# Patient Record
Sex: MALE | Race: BLACK OR AFRICAN AMERICAN | NOT HISPANIC OR LATINO | Employment: UNEMPLOYED | URBAN - METROPOLITAN AREA
[De-identification: names, ages, dates, MRNs, and addresses within clinical notes are randomized per-mention and may not be internally consistent; named-entity substitution may affect disease eponyms.]

---

## 2017-12-29 ENCOUNTER — HOSPITAL ENCOUNTER (OUTPATIENT)
Dept: ULTRASOUND IMAGING | Facility: CLINIC | Age: 67
Discharge: HOME | End: 2017-12-29
Attending: FAMILY MEDICINE | Admitting: FAMILY MEDICINE
Payer: MEDICAID

## 2017-12-29 DIAGNOSIS — R31.9 HEMATURIA: ICD-10-CM

## 2017-12-29 PROCEDURE — 76770 US EXAM ABDO BACK WALL COMP: CPT

## 2017-12-29 PROCEDURE — T1013 SIGN LANG/ORAL INTERPRETER: HCPCS | Mod: U3

## 2018-02-14 ENCOUNTER — OFFICE VISIT (OUTPATIENT)
Dept: FAMILY MEDICINE | Facility: CLINIC | Age: 68
End: 2018-02-14
Payer: COMMERCIAL

## 2018-02-14 VITALS
SYSTOLIC BLOOD PRESSURE: 128 MMHG | RESPIRATION RATE: 16 BRPM | WEIGHT: 133.4 LBS | BODY MASS INDEX: 20.94 KG/M2 | TEMPERATURE: 98.2 F | HEIGHT: 67 IN | DIASTOLIC BLOOD PRESSURE: 82 MMHG | HEART RATE: 62 BPM

## 2018-02-14 DIAGNOSIS — R10.11 BILATERAL UPPER ABDOMINAL PAIN: Primary | ICD-10-CM

## 2018-02-14 DIAGNOSIS — R31.29 OTHER MICROSCOPIC HEMATURIA: ICD-10-CM

## 2018-02-14 DIAGNOSIS — R10.12 BILATERAL UPPER ABDOMINAL PAIN: Primary | ICD-10-CM

## 2018-02-14 LAB
% GRANULOCYTES: 41.5 %G (ref 40–75)
ALBUMIN SERPL-MCNC: 4.3 MG/DL (ref 3.5–4.7)
ALP SERPL-CCNC: 63.2 U/L (ref 31.7–110.7)
ALT SERPL-CCNC: 10.7 U/L (ref 0–45)
AST SERPL-CCNC: 11.3 U/L (ref 0–55)
BACTERIA: NORMAL
BILIRUB SERPL-MCNC: 0.5 MG/DL (ref 0.2–1.3)
BILIRUBIN UR: ABNORMAL
BLOOD UR: ABNORMAL
BUN SERPL-MCNC: 7.2 MG/DL (ref 7–21)
CALCIUM SERPL-MCNC: 9.8 MG/DL (ref 8.5–10.1)
CASTS: NORMAL /LPF
CHLORIDE SERPLBLD-SCNC: 103.5 MMOL/L (ref 98–110)
CO2 SERPL-SCNC: 26.5 MMOL/L (ref 20–32)
CREAT SERPL-MCNC: 0.7 MG/DL (ref 0.7–1.3)
CRYSTAL URINE: NORMAL /LPF
EPITHELIAL CELLS UR: NORMAL /LPF (ref 0–2)
GFR SERPL CREATININE-BSD FRML MDRD: >90 ML/MIN/1.7 M2
GLUCOSE SERPL-MCNC: 98 MG'DL (ref 70–99)
GLUCOSE URINE: NEGATIVE
GRANULOCYTES #: 2.2 K/UL (ref 1.6–8.3)
HCT VFR BLD AUTO: 45.9 % (ref 40–53)
HEMOGLOBIN: 14.9 G/DL (ref 13.3–17.7)
KETONES UR QL: NEGATIVE
LEUKOCYTE ESTERASE UR: NEGATIVE
LYMPHOCYTES # BLD AUTO: 2.3 K/UL (ref 0.8–5.3)
LYMPHOCYTES NFR BLD AUTO: 43.8 %L (ref 20–48)
MCH RBC QN AUTO: 31.4 PG (ref 26.5–35)
MCHC RBC AUTO-ENTMCNC: 32.5 G/DL (ref 32–36)
MCV RBC AUTO: 96.7 FL (ref 78–100)
MID #: 0.8 K/UL (ref 0–2.2)
MID %: 14.7 %M (ref 0–20)
MUCOUS URINE: NORMAL LPF
NITRITE UR QL STRIP: NEGATIVE
PH UR STRIP: 5.5 [PH] (ref 5–7)
PLATELET # BLD AUTO: 270 K/UL (ref 150–450)
POTASSIUM SERPL-SCNC: 3.8 MMOL/DL (ref 3.3–4.5)
PROT SERPL-MCNC: 7.9 G/DL (ref 6.8–8.8)
PROTEIN UR: NEGATIVE
RBC # BLD AUTO: 4.75 M/UL (ref 4.4–5.9)
RBC URINE: NORMAL /HPF
SODIUM SERPL-SCNC: 138.8 MMOL/L (ref 132.6–141.4)
SP GR UR STRIP: 1.02
UROBILINOGEN UR STRIP-ACNC: ABNORMAL
WBC # BLD AUTO: 5.3 K/UL (ref 4–11)
WBC URINE: NORMAL /HPF

## 2018-02-14 RX ORDER — ACETAMINOPHEN 325 MG/1
650 TABLET ORAL EVERY 6 HOURS PRN
Qty: 100 TABLET | Refills: 1 | Status: SHIPPED | OUTPATIENT
Start: 2018-02-14 | End: 2023-01-02

## 2018-02-14 NOTE — MR AVS SNAPSHOT
After Visit Summary   2/14/2018    Buck Delacruz    MRN: [id number]           Patient Information     Date Of Birth          1950        Visit Information        Provider Department      2/14/2018 8:20 AM Daryl Watts MD [facility]        Today's Diagnoses     Bilateral upper abdominal pain    -  1    Other microscopic hematuria          Care Instructions    Here is the plan from today's visit    1. Bilateral upper abdominal pain  - CBC with Diff Plt ([facility])  - Comprehensive Metabolic Panel ([facility])  - acetaminophen (TYLENOL) 325 MG tablet; Take 2 tablets (650 mg) by mouth every 6 hours as needed for pain  Dispense: 100 tablet; Refill: 1    2. Other microscopic hematuria  - UROLOGY ADULT REFERRAL - INTERNAL      Follow up plan  Schedule with urology for microscopic blood in the urine.    Thank you for coming to [facility] today.  Lab Testing:  **If you had lab testing today and your results are reassuring or normal they will be mailed to you or sent through [employer]ch within 7 days.   **If the lab tests need quick action we will call you with the results.  The phone number we will call with results is # [phone] (home) . If this is not the best number please call our clinic and change the number.  Medication Refills:  If you need any refills please call your pharmacy and they will contact us.   If you need to  your refill at a new pharmacy, please contact the new pharmacy directly. The new pharmacy will help you get your medications transferred faster.   Scheduling:  If you have any concerns about today's visit or wish to schedule another appointment please call our office during normal business hours [phone] (8-5:00 M-F)  If a referral was made to a [facility] and you don't get a call from central scheduling please call [phone].  If a Mammogram was ordered for you at [facility] call [phone] to schedule or  change your appointment.  If you had an XRay/CT/Ultrasound/MRI ordered the number is [phone] to schedule or change your radiology appointment.   Medical Concerns:  If you have urgent medical concerns please call [phone] at any time of the day.            Follow-ups after your visit        Additional Services     UROLOGY ADULT REFERRAL - INTERNAL       Your provider has referred you to: [facility]: [facility] [phone]   [email or web address]    Please be aware that coverage of these services is subject to the terms and limitations of your health insurance plan.  Call member services at your health plan with any benefit or coverage questions.      Please bring the following with you to your appointment:    (1) Any X-Rays, CTs or MRIs which have been performed.  Contact the facility where they were done to arrange for  prior to your scheduled appointment.    (2) List of current medications  (3) This referral request   (4) Any documents/labs given to you for this referral                  Who to contact     Please call your clinic at [phone] to:    Ask questions about your health    Make or cancel appointments    Discuss your medicines    Learn about your test results    Speak to your doctor            Additional Information About Your Visit        MyChart Information     ev-socialhart is an electronic gateway that provides easy, online access to your medical records. With BollingoBlog, you can request a clinic appointment, read your test results, renew a prescription or communicate with your care team.     To sign up for [employer]angat visit the website at [email or web address]   You will be asked to enter the access code listed below, as well as some personal information. Please follow the directions to create your username and password.     Your access code is: [email or web address]  Expires: 5/15/2018  9:55 AM     Your access code will  in 90 days. If you need help or a new code, please  "contact your [facility] or call [phone] for assistance.        Care EveryWhere ID     This is your Care EveryWhere ID. This could be used by other organizations to access your [facility] medical records  [id number]        Your Vitals Were     Pulse Temperature Respirations Height BMI (Body Mass Index)       62 98.2  F (36.8  C) (Oral) 16 5' 7\" (170.2 cm) 20.89 kg/m2        Blood Pressure from Last 3 Encounters:   02/14/18 128/82    Weight from Last 3 Encounters:   02/14/18 133 lb 6.4 oz (60.5 kg)              We Performed the Following     CBC with Diff Plt ([facility])     Comprehensive Metabolic Panel ([facility])     Urinalysis, Micro If (UA) ([facility])     Urine Microscopic (UA) ([facility])     UROLOGY ADULT REFERRAL - INTERNAL          Today's Medication Changes          These changes are accurate as of 2/14/18  9:55 AM.  If you have any questions, ask your nurse or doctor.               Start taking these medicines.        Dose/Directions    acetaminophen 325 MG tablet   Commonly known as:  TYLENOL   Used for:  Bilateral upper abdominal pain   Started by:  Daryl Watts MD        Dose:  650 mg   Take 2 tablets (650 mg) by mouth every 6 hours as needed for pain   Quantity:  100 tablet   Refills:  1            Where to get your medicines      These medications were sent to [facility] [place], [place] - [address]   [address] [facility] [address]     Phone:  [phone]     acetaminophen 325 MG tablet                Primary Care Provider Office Phone # Fax #    Clinic [facility] [phone]       [address] [facility] [address]        Equal Access to Services     KODI CASSIDY AH: Hadii christie crews Sodennis, waaxda luqadaha, qaybta kaalmada nhung glaser. So [facility] [phone].    ATENCIÓN: Si habla español, tiene a wilcox disposición servicios gratuitos de asistencia lingüística. Llame al " [phone].    We comply with applicable federal civil rights laws and [place] laws. We do not discriminate on the basis of race, color, national origin, age, disability, sex, sexual orientation, or gender identity.            Thank you!     Thank you for choosing [facility]  for your care. Our goal is always to provide you with excellent care. Hearing back from our patients is one way we can continue to improve our services. Please take a few minutes to complete the written survey that you may receive in the mail after your visit with us. Thank you!             Your Updated Medication List - Protect others around you: Learn how to safely use, store and throw away your medicines at www.disposemymeds.org.          This list is accurate as of 2/14/18  9:55 AM.  Always use your most recent med list.                   Brand Name Dispense Instructions for use Diagnosis    acetaminophen 325 MG tablet    TYLENOL    100 tablet    Take 2 tablets (650 mg) by mouth every 6 hours as needed for pain    Bilateral upper abdominal pain

## 2018-02-14 NOTE — PATIENT INSTRUCTIONS
Here is the plan from today's visit    1. Bilateral upper abdominal pain  - CBC with Diff Plt ([facility])  - Comprehensive Metabolic Panel ([facility])  - acetaminophen (TYLENOL) 325 MG tablet; Take 2 tablets (650 mg) by mouth every 6 hours as needed for pain  Dispense: 100 tablet; Refill: 1    2. Other microscopic hematuria  - UROLOGY ADULT REFERRAL - INTERNAL      Follow up plan  Schedule with urology for microscopic blood in the urine.    Thank you for coming to [facility] today.  Lab Testing:  **If you had lab testing today and your results are reassuring or normal they will be mailed to you or sent through [employer] within 7 days.   **If the lab tests need quick action we will call you with the results.  The phone number we will call with results is # [phone] (home) . If this is not the best number please call our clinic and change the number.  Medication Refills:  If you need any refills please call your pharmacy and they will contact us.   If you need to  your refill at a new pharmacy, please contact the new pharmacy directly. The new pharmacy will help you get your medications transferred faster.   Scheduling:  If you have any concerns about today's visit or wish to schedule another appointment please call our office during normal business hours [phone] (8-5:00 M-F)  If a referral was made to a [facility] and you don't get a call from central scheduling please call [phone].  If a Mammogram was ordered for you at [facility] call [phone] to schedule or change your appointment.  If you had an XRay/CT/Ultrasound/MRI ordered the number is [phone] to schedule or change your radiology appointment.   Medical Concerns:  If you have urgent medical concerns please call [phone] at any time of the day.

## 2018-02-14 NOTE — NURSING NOTE
used this visit:  Name: Uriel ANTHONY  Language: Sri Lankan  Agency: [facility]  Phone:[phone]  Mariola Champion

## 2018-02-14 NOTE — PROGRESS NOTES
HPI:       Buck Delacruz is a 68 year old who presents for the following    ABDOMINAL Pain     Onset:  2 months     Description:   Character: Dull ache and Cramping  Location: right upper quadrant  Radiation: Back    Intensity: mild, moderate    Progression of Symptoms:  same and constant    Accompanying Signs & Symptoms:  Fever/Chills?: No   Gas/Bloating: No   Dysuria:No   Hematuria:  YES - complains of dark urine in AM's   Nausea: No   Vomiting: No   Diarrhea:No   Constipation: YES - for last 2 months     History:           Trauma: lifting something heavy and felt it give out   Previous similar pain: No    Previous tests done: none    Precipitating factors:   Does the pain change with:     Food?: no     BM?: no     Urination:no     What makes it better?:  unsure    Therapies Tried and outcome: Nothing    +++++++  Genitourinary - Male  Onset:  Many years     Description:   Dysuria (painful urination): Yes Details: sometimes  Hematuria (blood in urine):Yes Details: states looks like blood in AM  Frequency: no  How many times are you getting up to urinate at night? : 1-2 times  Hesitancy (delay in starting urine):Yes  Retention (unable to empty): no  Decrease in urinary flow: Yes   Incontinence: no    Progression of Symptoms:  same    Accompanying Signs & Symptoms:  Fever: no  Back/Flank pain:Yes Details: almost 50 years of back pain  Urethral discharge:no  Testicle lumps/masses/pain: no  Nausea and/or vomiting: no  Abdominal pain:Yes Details:  As above     History:   History of frequent UTI's: no  History of kidney stones: no  History of hernias: no  Personal or Family history of Prostate problems:no  Sexually active:no    What makes it worse?:   unsure    What makes it better?:  unsure    Therapies Tried and outcome:  nothing    A [facility]  was used for  this visit.         Patient Active Problem List    Diagnosis Date Noted     Other microscopic hematuria 02/14/2018     Priority: Medium      "Bilateral upper abdominal pain 02/14/2018     Priority: Medium      Medications:   No current outpatient prescriptions on file.       No Known Allergies.    Patient is   a new patient to this clinic and so  I reviewed/updated the Past Medical History, the Family History and the Social History. ,   Past Medical History:   Diagnosis Date     Back injury 1969     Bilateral upper abdominal pain 2/14/2018     Wrist injury 1969   ,   Family History        Negative family history of: DIABETES, Coronary Artery Disease, Hypertension, Hyperlipidemia, CEREBROVASCULAR DISEASE       and   Social History     Social History     Marital status: Single     Spouse name: N/A     Number of children: 9     Years of education: N/A     Social History Main Topics     Smoking status: Never Smoker     Smokeless tobacco: Never Used     Alcohol use No     Drug use: No     Sexual activity: Not Currently     Other Topics Concern     None     Social History Narrative    [place] from [facility] in June 2017   .         Review of Systems:   CONSTITUTIONAL: no fatigue, no unexpected change in weight  SKIN: no worrisome rashes, no worrisome moles, no worrisome lesions  EYES: no acute vision problems or changes  ENT: no ear problems, no mouth problems, no throat problems  RESP: no significant cough, no shortness of breath  CV: no chest pain, no palpitations, no new or worsening peripheral edema  GI: no nausea, no vomiting, no constipation, no diarrhea, and as above    male : as above   MS: no claudication, no myalgias, no joint aches  NEURO: no weakness, no dizziness, no syncope, no headaches  ENDOCRINE: no temperature intolerance, no skin/hair changes  HEME: no easy bruising, no bleeding problems  PSYCHIATRIC: NEGATIVE for changes in mood or affect         Physical Exam:     Vitals:    02/14/18 0832   BP: 128/82   Pulse: 62   Resp: 16   Temp: 98.2  F (36.8  C)   TempSrc: Oral   Weight: 133 lb 6.4 oz (60.5 kg)   Height: 5' 7\" (170.2 cm)     Body " mass index is 20.89 kg/(m^2).  Vitals were reviewed and were normal  GENERAL: alert and no distress  EYES: Eyes grossly normal to inspection, extraocular movements - intact, and PERRL  HENT: ear canals- normal; TMs- normal; Nose- normal; Mouth- no ulcers, no lesions  NECK: no tenderness, no adenopathy, no asymmetry, no masses, no stiffness; thyroid- normal to palpation  RESP: lungs clear to auscultation - no rales, no rhonchi, no wheezes  CHEST:  No pain to palpation  CV: regular rates and rhythm, normal S1 S2, no S3 or S4 and no murmur, no click or rub -  ABDOMEN: pain to palpation in bilateral upper abdomen and lower anterior chest, soft, no  hepatosplenomegaly, no masses, normal bowel sounds  MS: extremities- no gross deformities noted, no edema  SKIN: no suspicious lesions, no rashes  NEURO: strength and tone- normal, sensory exam- grossly normal, mentation- intact, speech- normal, reflexes- symmetric  BACK: no CVA tenderness, no paralumbar tenderness  PSYCH: mentation appears normal and affect normal/bright  LYMPHATICS: normal ant/post cervical, supraclavicular nodes      Results:      Results from the last 24 hours  Results for orders placed or performed in visit on 02/14/18 (from the past 24 hour(s))   CBC with Diff Plt ([facility])   Result Value Ref Range    WBC 5.3 4.0 - 11.0 K/uL    Lymphocytes # 2.3 0.8 - 5.3 K/uL    % Lymphocytes 43.8 20.0 - 48.0 %L    Mid # 0.8 0.0 - 2.2 K/uL    Mid % 14.7 0.0 - 20.0 %M    GRANULOCYTES # 2.2 1.6 - 8.3 K/uL    % Granulocytes 41.5 40.0 - 75.0 %G    RBC 4.75 4.40 - 5.90 M/uL    Hemoglobin 14.9 13.3 - 17.7 g/dL    Hematocrit 45.9 40.0 - 53.0 %    MCV 96.7 78.0 - 100.0 fL    MCH 31.4 26.5 - 35.0 pg    MCHC 32.5 32.0 - 36.0 g/dL    Platelets 270.0 150.0 - 450.0 K/uL   Comprehensive Metabolic Panel ([facility])   Result Value Ref Range    Albumin 4.3 3.5 - 4.7 mg/dL    Alkaline Phosphatase 63.2 31.7 - 110.7 U/L    ALT 10.7 0.0 - 45.0 U/L    AST 11.3 0.0 - 55.0 U/L    Bilirubin  Total 0.5 0.2 - 1.3 mg/dL    Urea Nitrogen 7.2 7.0 - 21.0 mg/dL    Calcium 9.8 8.5 - 10.1 mg/dL    Chloride 103.5 98.0 - 110.0 mmol/L    Carbon Dioxide 26.5 20.0 - 32.0 mmol/L    Creatinine 0.7 0.7 - 1.3 mg/dL    Glucose 98.0 70.0 - 99.0 mg'dL    Potassium 3.8 3.3 - 4.5 mmol/dL    Sodium 138.8 132.6 - 141.4 mmol/L    Protein Total 7.9 6.8 - 8.8 g/dL    GFR Estimate >90 >60.0 mL/min/1.7 m2    GFR Estimate If Black >90 >60.0 mL/min/1.7 m2   Urinalysis, Micro If (UA) ([facility])   Result Value Ref Range    Specific Gravity Urine 1.025 1.005 - 1.030    pH Urine 5.5 4.5 - 8.0    Leukocyte Esterase UR Negative NEGATIVE    Nitrite Urine Negative NEGATIVE    Protein UR Negative NEGATIVE    Glucose Urine Negative NEGATIVE    Ketones Urine Negative NEGATIVE    Urobilinogen mg/dL 0.2 E.U./dL 0.2 E.U./dL    Bilirubin UR 1+ (A) NEGATIVE    Blood UR 2+ (A) NEGATIVE   Urine Microscopic (UA) ([facility])   Result Value Ref Range    WBC Urine None <5 /hpf    RBC Urine 25-50 <5 /hpf    Epithelial Cells UR None 0 - 2 /lpf    Mucous Urine Moderate NONE lpf    Casts Urine None NONE /lpf    Crystal Urine None NONE /lpf    Bacteria Wet Prep None None       Assessment and Plan     1. Bilateral upper abdominal pain  Suspect MSK related pain related to minor injury 2 months ago.  Patient is quite deconditioned therefore the likely reason behind the prolonged symptoms.  Will try tylenol and heat prn.  Consider Physical Therapy if not resolving.  - CBC with Diff Plt ([facility])  - Comprehensive Metabolic Panel ([facility])  - acetaminophen (TYLENOL) 325 MG tablet; Take 2 tablets (650 mg) by mouth every 6 hours as needed for pain  Dispense: 100 tablet; Refill: 1    2. Other microscopic hematuria  Referral to urology.  Will leave to their discretion whether or not to order CT scan and/or PSA.  - UROLOGY ADULT REFERRAL - INTERNAL    Options for treatment and follow-up care were reviewed with the patient. Buck Delacruz  engaged in the decision making  process and verbalized understanding of the options discussed and agreed with the final plan.    Daryl Watts MD

## 2018-02-19 ENCOUNTER — PRE VISIT (OUTPATIENT)
Dept: UROLOGY | Facility: CLINIC | Age: 68
End: 2018-02-19

## 2018-02-21 ENCOUNTER — OFFICE VISIT (OUTPATIENT)
Dept: FAMILY MEDICINE | Facility: CLINIC | Age: 68
End: 2018-02-21
Payer: COMMERCIAL

## 2018-02-21 VITALS
HEART RATE: 67 BPM | SYSTOLIC BLOOD PRESSURE: 129 MMHG | TEMPERATURE: 98.2 F | DIASTOLIC BLOOD PRESSURE: 79 MMHG | OXYGEN SATURATION: 96 % | RESPIRATION RATE: 16 BRPM | WEIGHT: 140.6 LBS | BODY MASS INDEX: 22.02 KG/M2

## 2018-02-21 DIAGNOSIS — Z00.00 HEALTHCARE MAINTENANCE: ICD-10-CM

## 2018-02-21 DIAGNOSIS — G89.29 CHRONIC BILATERAL LOW BACK PAIN WITHOUT SCIATICA: Primary | ICD-10-CM

## 2018-02-21 DIAGNOSIS — M54.50 CHRONIC BILATERAL LOW BACK PAIN WITHOUT SCIATICA: Primary | ICD-10-CM

## 2018-02-21 RX ORDER — CYCLOBENZAPRINE HCL 10 MG
5-10 TABLET ORAL 3 TIMES DAILY PRN
Qty: 30 TABLET | Refills: 1 | Status: SHIPPED | OUTPATIENT
Start: 2018-02-21 | End: 2023-01-02

## 2018-02-21 NOTE — PROGRESS NOTES
HPI:       Buck Delacruz is a 68 year old who presents for the following    Back Pain      Duration: 20 or more years         Specific cause:  unsure    Description:   Location of pain: low back bilateral  Character of pain: sharp, dull ache and intermittent  Pain radiation:occasional radiation to bilateral thighs  New numbness or weakness in legs, not attributed to pain:  No   Any new bowel or bladder incontinence?No     Intensity: moderate    History:   Pain interferes with job/home/school: doesn't work  History of back problems:  At least 20 years of pain  Any previous MRI or X-rays: None  Sees a specialist for back pain:  No  Therapies tried without relief: acetaminophen (Tylenol)    Alleviating factors:   Improved by:  nothing      Precipitating factors:  Worsened by:  Moving from sitting to standing      Accompanying Signs & Symptoms:  Risk of Fracture: No   Risk of Cauda Equina:No   Risk of Infection:  No   Risk of Cancer:  No     A [facility]  was used for  this visit.      Problem, Medication and Allergy Lists were reviewed and are current..    Patient is an established patient of this clinic.         Physical Exam:     Vitals:    02/21/18 0819   BP: 129/79   Pulse: 67   Resp: 16   Temp: 98.2  F (36.8  C)   TempSrc: Oral   SpO2: 96%   Weight: 140 lb 9.6 oz (63.8 kg)     Body mass index is 22.02 kg/(m^2).  Vitals were reviewed and were normal  GENERAL: healthy, alert and no distress  MS: extremities- no gross deformities noted, no edema  NEURO: strength and tone- normal, sensory exam- grossly normal, mentation- intact, speech- normal, reflexes- symmetric  Comprehensive back pain exam:  Tenderness of bilateral lumbar paraspinal muscles, Pain limits the following motions: flexion and extension, Lower extremity strength functional and equal on both sides, Lower extremity reflexes within normal limits bilaterally, Lower extremity sensation normal and equal on both sides and Straight leg raise  negative bilaterally  PSYCH: affect normal/bright      Results:     Attempted Lumbar xray but not available in clinic today    Assessment and Plan     1. Chronic bilateral low back pain without sciatica  Due to chronic nature of condition, will check xray at some point to rule out old fracture vs DJD vs other  - X-ray lumbar spine 2-3 views*; Future  - trial of cyclobenzaprine (FLEXERIL) 10 MG tablet; Take 0.5-1 tablets (5-10 mg) by mouth 3 times daily as needed for muscle spasms  Dispense: 30 tablet; Refill: 1  - start [facility], PT, HAND AND CHIROPRACTIC REFERRAL - [facility]    2. Healthcare maintenance  Update Hepatitis B #3  - ADMIN VACCINE, INITIAL  - HEPATITIS B VACCINE,ADULT,IM    25 min spent in direct face to face time with this patient, greater than 50% in counseling regarding above.    Options for treatment and follow-up care were reviewed with the patient. Buck Delacruz  engaged in the decision making process and verbalized understanding of the options discussed and agreed with the final plan.    Daryl Watts MD

## 2018-02-21 NOTE — PATIENT INSTRUCTIONS
Here is the plan from today's visit    1. Chronic bilateral low back pain without sciatica  Use the muscle relaxer 1/2-1 tab up to three times a day   Schedule with Physical Therapy   We will check the xray at some time in the future    - X-ray lumbar spine 2-3 views*; Future  - cyclobenzaprine (FLEXERIL) 10 MG tablet; Take 0.5-1 tablets (5-10 mg) by mouth 3 times daily as needed for muscle spasms  Dispense: 30 tablet; Refill: 1  - [facility], PT, HAND AND CHIROPRACTIC REFERRAL - [facility]    Please call or return to clinic if your symptoms don't go away.    Thank you for coming to [facility] today.  Lab Testing:  **If you had lab testing today and your results are reassuring or normal they will be mailed to you or sent through [employer] within 7 days.   **If the lab tests need quick action we will call you with the results.  The phone number we will call with results is # [phone] (home) . If this is not the best number please call our clinic and change the number.  Medication Refills:  If you need any refills please call your pharmacy and they will contact us.   If you need to  your refill at a new pharmacy, please contact the new pharmacy directly. The new pharmacy will help you get your medications transferred faster.   Scheduling:  If you have any concerns about today's visit or wish to schedule another appointment please call our office during normal business hours [phone] (8-5:00 M-F)  If a referral was made to a [facility] and you don't get a call from central scheduling please call [phone].  If a Mammogram was ordered for you at [facility] call [phone] to schedule or change your appointment.  If you had an XRay/CT/Ultrasound/MRI ordered the number is [phone] to schedule or change your radiology appointment.   Medical Concerns:  If you have urgent medical concerns please call [phone] at any time of the day.    Clinic [facility]

## 2018-02-21 NOTE — MR AVS SNAPSHOT
After Visit Summary   2/21/2018    Buck Delacruz    MRN: [id number]           Patient Information     Date Of Birth          1950        Visit Information        Provider Department      2/21/2018 8:00 AM Daryl Watts MD [facility]        Today's Diagnoses     Chronic bilateral low back pain without sciatica    -  1      Care Instructions    Here is the plan from today's visit    1. Chronic bilateral low back pain without sciatica  Use the muscle relaxer 1/2-1 tab up to three times a day   Schedule with Physical Therapy   We will check the xray at some time in the future    - X-ray lumbar spine 2-3 views*; Future  - cyclobenzaprine (FLEXERIL) 10 MG tablet; Take 0.5-1 tablets (5-10 mg) by mouth 3 times daily as needed for muscle spasms  Dispense: 30 tablet; Refill: 1  - COSME, PT, HAND AND CHIROPRACTIC REFERRAL - [facility]    Please call or return to clinic if your symptoms don't go away.    Thank you for coming to [facility] today.  Lab Testing:  **If you had lab testing today and your results are reassuring or normal they will be mailed to you or sent through [employer] within 7 days.   **If the lab tests need quick action we will call you with the results.  The phone number we will call with results is # [phone] (home) . If this is not the best number please call our clinic and change the number.  Medication Refills:  If you need any refills please call your pharmacy and they will contact us.   If you need to  your refill at a new pharmacy, please contact the new pharmacy directly. The new pharmacy will help you get your medications transferred faster.   Scheduling:  If you have any concerns about today's visit or wish to schedule another appointment please call our office during normal business hours [phone] (8-5:00 M-F)  If a referral was made to a [facility] and you don't get a call from central scheduling please call  [phone].  If a Mammogram was ordered for you at [facility] call [phone] to schedule or change your appointment.  If you had an XRay/CT/Ultrasound/MRI ordered the number is [phone] to schedule or change your radiology appointment.   Medical Concerns:  If you have urgent medical concerns please call [phone] at any time of the day.    Clinic [facility]            Follow-ups after your visit        Additional Services     COSME, PT, HAND AND CHIROPRACTIC REFERRAL - [facility]       **This order will print in the [facility] Scheduling Office**    Physical Therapy, Hand Therapy and Chiropractic Care are available through:    *[facility]  *[facility]  *[facility]    Call one number to schedule at any of the above locations: [phone].    Your provider has referred you to: Physical Therapy at [facility] or [facility]    Indication/Reason for Referral: Low Back Pain  Onset of Illness:  > 20 years   Therapy Orders: Evaluate and Treat  Special Programs: None  Special Request: None    Donya Naqvi      Additional Comments for the Therapist or Chiropractor:  Only speaks Cymraes.  Call  (Uriel):  [phone] to schedule    Please be aware that coverage of these services is subject to the terms and limitations of your health insurance plan.  Call member services at your health plan with any benefit or coverage questions.      Please bring the following to your appointment:    *Your personal calendar for scheduling future appointments  *Comfortable clothing                  Your next 10 appointments already scheduled     Apr 02, 2018  3:30 PM CDT   (Arrive by 3:15 PM)   New Patient Visit with Gabriela Seals PA-C   [facility] for Prostate and Urologic Cancers ([facility])    [address]  [address] [place] [address] [phone]              Future tests that were ordered for you today     Open Future  Orders        Priority Expected Expires Ordered    X-ray lumbar spine 2-3 views* Routine 2018            Who to contact     Please call your clinic at [phone] to:    Ask questions about your health    Make or cancel appointments    Discuss your medicines    Learn about your test results    Speak to your doctor            Additional Information About Your Visit        MyChart Information     Kirusa is an electronic gateway that provides easy, online access to your medical records. With Kirusa, you can request a clinic appointment, read your test results, renew a prescription or communicate with your care team.     To sign up for Kirusa visit the website at [email or web address]   You will be asked to enter the access code listed below, as well as some personal information. Please follow the directions to create your username and password.     Your access code is: [email or web address]  Expires: 5/15/2018  9:55 AM     Your access code will  in 90 days. If you need help or a new code, please contact your [facility] or call [phone] for assistance.        Care EveryWhere ID     This is your Care EveryWhere ID. This could be used by other organizations to access your [place] medical records  [id number]        Your Vitals Were     Pulse Temperature Respirations Pulse Oximetry BMI (Body Mass Index)       67 98.2  F (36.8  C) (Oral) 16 96% 22.02 kg/m2        Blood Pressure from Last 3 Encounters:   18 129/79   18 128/82    Weight from Last 3 Encounters:   18 140 lb 9.6 oz (63.8 kg)   18 133 lb 6.4 oz (60.5 kg)              We Performed the Following     COSME, PT, HAND AND CHIROPRACTIC REFERRAL - [facility]          Today's Medication Changes          These changes are accurate as of 18  8:53 AM.  If you have any questions, ask your nurse or doctor.               Start taking these medicines.        Dose/Directions     cyclobenzaprine 10 MG tablet   Commonly known as:  FLEXERIL   Used for:  Chronic bilateral low back pain without sciatica   Started by:  Daryl Watts MD        Dose:  5-10 mg   Take 0.5-1 tablets (5-10 mg) by mouth 3 times daily as needed for muscle spasms   Quantity:  30 tablet   Refills:  1            Where to get your medicines      These medications were sent to [facility] [place], [place] - [address] [facility] [address]     Phone:  [phone]     cyclobenzaprine 10 MG tablet                Primary Care Provider Office Phone # Fax #    Clinic [facility] [phone]       [address] [address]        Equal Access to Services     SITA CASSIDY : Hadii christie johnson hadasho Sotommyali, waaxda luqadaha, qaybta kaalmada adeegyada, nhung handy . So [facility] [phone].    ATENCIÓN: Si habla español, tiene a wilcox disposición servicios gratuitos de asistencia lingüística. [facility] [phone].    We comply with applicable federal civil rights laws and [place] laws. We do not discriminate on the basis of race, color, national origin, age, disability, sex, sexual orientation, or gender identity.            Thank you!     Thank you for choosing [facility]  for your care. Our goal is always to provide you with excellent care. Hearing back from our patients is one way we can continue to improve our services. Please take a few minutes to complete the written survey that you may receive in the mail after your visit with us. Thank you!             Your Updated Medication List - Protect others around you: Learn how to safely use, store and throw away your medicines at www.disposemymeds.org.          This list is accurate as of 2/21/18  8:53 AM.  Always use your most recent med list.                   Brand Name Dispense Instructions for use Diagnosis    acetaminophen 325 MG tablet    TYLENOL    100 tablet    Take  2 tablets (650 mg) by mouth every 6 hours as needed for pain    Bilateral upper abdominal pain       cyclobenzaprine 10 MG tablet    FLEXERIL    30 tablet    Take 0.5-1 tablets (5-10 mg) by mouth 3 times daily as needed for muscle spasms    Chronic bilateral low back pain without sciatica

## 2018-03-14 ENCOUNTER — PRE VISIT (OUTPATIENT)
Dept: UROLOGY | Facility: CLINIC | Age: 68
End: 2018-03-14

## 2018-03-14 ENCOUNTER — DOCUMENTATION ONLY (OUTPATIENT)
Dept: VASCULAR SURGERY | Facility: CLINIC | Age: 68
End: 2018-03-14

## 2018-03-14 DIAGNOSIS — Z13.6 ENCOUNTER FOR ABDOMINAL AORTIC ANEURYSM (AAA) SCREENING: Primary | ICD-10-CM

## 2018-03-20 ENCOUNTER — OFFICE VISIT (OUTPATIENT)
Dept: FAMILY MEDICINE | Facility: CLINIC | Age: 68
End: 2018-03-20
Payer: COMMERCIAL

## 2018-03-20 VITALS
DIASTOLIC BLOOD PRESSURE: 81 MMHG | BODY MASS INDEX: 20.16 KG/M2 | RESPIRATION RATE: 16 BRPM | OXYGEN SATURATION: 97 % | SYSTOLIC BLOOD PRESSURE: 133 MMHG | HEART RATE: 74 BPM | WEIGHT: 133 LBS | HEIGHT: 68 IN | TEMPERATURE: 97.7 F

## 2018-03-20 DIAGNOSIS — Z23 IMMUNIZATION DUE: ICD-10-CM

## 2018-03-20 DIAGNOSIS — R31.29 OTHER MICROSCOPIC HEMATURIA: ICD-10-CM

## 2018-03-20 DIAGNOSIS — Z00.00 MEDICARE ANNUAL WELLNESS VISIT, SUBSEQUENT: Primary | ICD-10-CM

## 2018-03-20 LAB
HCV AB SERPL QL IA: NONREACTIVE
PSA SERPL-ACNC: 0.88 UG/L (ref 0–4)

## 2018-03-20 NOTE — MR AVS SNAPSHOT
After Visit Summary   3/20/2018    Buck Delacruz    MRN: [id number]           Patient Information     Date Of Birth          1950        Visit Information        Provider Department      3/20/2018 10:20 AM Daryl Watts MD [facility]        Today's Diagnoses     Medicare annual wellness visit, subsequent    -  1    Other microscopic hematuria          Care Instructions    Here is the plan from today's visit    1. Medicare annual wellness visit, subsequent  - M Tuberculosis by Quantiferon  - Hepatitis C antibody    2. Other microscopic hematuria  - Prostate spec antigen screen      Follow up plan  Based on results of lab results.    Thank you for coming to [facility] today.  Lab Testing:  **If you had lab testing today and your results are reassuring or normal they will be mailed to you or sent through [employer] within 7 days.   **If the lab tests need quick action we will call you with the results.  The phone number we will call with results is # [phone] (home) . If this is not the best number please call our clinic and change the number.  Medication Refills:  If you need any refills please call your pharmacy and they will contact us.   If you need to  your refill at a new pharmacy, please contact the new pharmacy directly. The new pharmacy will help you get your medications transferred faster.   Scheduling:  If you have any concerns about today's visit or wish to schedule another appointment please call our office during normal business hours [phone] (8-5:00 M-F)  If a referral was made to a [facility] and you don't get a call from central scheduling please call [phone].  If a Mammogram was ordered for you at [facility] call [phone] to schedule or change your appointment.  If you had an XRay/CT/Ultrasound/MRI ordered the number is [phone] to schedule or change your radiology appointment.   Medical  "Concerns:  If you have urgent medical concerns please call [phone] at any time of the day.    Daryl Watts MD            Follow-ups after your visit        Your next 10 appointments already scheduled     2018  3:30 PM CDT   (Arrive by 3:15 PM)   New Patient Visit with Gabriela Seals PA-C   [facility] for Prostate and Urologic Cancers ([facility])    [address] [place] [address]  [address] [facility] [address] [phone]              Who to contact     Please call your clinic at [phone] to:    Ask questions about your health    Make or cancel appointments    Discuss your medicines    Learn about your test results    Speak to your doctor            Additional Information About Your Visit        AdInnovationhart Information     K[employer] is an electronic gateway that provides easy, online access to your medical records. With [employer]ik[employer], you can request a clinic appointment, read your test results, renew a prescription or communicate with your care team.     To sign up for [employer] visit the website at [email or web address]   You will be asked to enter the access code listed below, as well as some personal information. Please follow the directions to create your username and password.     Your access code is: [email or web address]  Expires: 5/15/2018 10:55 AM     Your access code will  in 90 days. If you need help or a new code, please contact your [facility] or call [phone] for assistance.        Care EveryWhere ID     This is your Care EveryWhere ID. This could be used by other organizations to access your [place] medical records  [id number]        Your Vitals Were     Pulse Temperature Respirations Height Pulse Oximetry BMI (Body Mass Index)    74 97.7  F (36.5  C) (Oral) 16 5' 7.52\" (171.5 cm) 97% 20.51 kg/m2       Blood Pressure from Last 3 Encounters:   18 133/81   18 129/79   18 128/82    Weight from " Last 3 Encounters:   03/20/18 133 lb (60.3 kg)   02/21/18 140 lb 9.6 oz (63.8 kg)   02/14/18 133 lb 6.4 oz (60.5 kg)              We Performed the Following     Hepatitis C antibody     M Tuberculosis by Quantiferon     Prostate spec antigen screen        Primary Care Provider Office Phone # Fax #    [facility] [phone]       [address] [facility] [address]        Equal Access to Services     SITA CASSIDY : Hadii aad ku hadasho Soomaali, waaxda luqadaha, qaybta kaalmada adeegyada, waxay idiin hayaan adeeg kharash la'aan . So [facility] [phone].    ATENCIÓN: Si habla español, tiene a wilcox disposición servicios gratuitos de asistencia lingüística. [facility] [phone].    We comply with applicable federal civil rights laws and [place] laws. We do not discriminate on the basis of race, color, national origin, age, disability, sex, sexual orientation, or gender identity.            Thank you!     Thank you for choosing [facility]  for your care. Our goal is always to provide you with excellent care. Hearing back from our patients is one way we can continue to improve our services. Please take a few minutes to complete the written survey that you may receive in the mail after your visit with us. Thank you!             Your Updated Medication List - Protect others around you: Learn how to safely use, store and throw away your medicines at www.disposemymeds.org.          This list is accurate as of 3/20/18 11:02 AM.  Always use your most recent med list.                   Brand Name Dispense Instructions for use Diagnosis    acetaminophen 325 MG tablet    TYLENOL    100 tablet    Take 2 tablets (650 mg) by mouth every 6 hours as needed for pain    Bilateral upper abdominal pain       cyclobenzaprine 10 MG tablet    FLEXERIL    30 tablet    Take 0.5-1 tablets (5-10 mg) by mouth 3 times daily as needed for muscle spasms    Chronic bilateral low back pain without sciatica

## 2018-03-20 NOTE — PATIENT INSTRUCTIONS
Here is the plan from today's visit    1. Medicare annual wellness visit, subsequent  - M Tuberculosis by Quantiferon  - Hepatitis C antibody    2. Other microscopic hematuria  - Prostate spec antigen screen      Follow up plan  Based on results of lab results.    Thank you for coming to [facility] today.  Lab Testing:  **If you had lab testing today and your results are reassuring or normal they will be mailed to you or sent through [employer] within 7 days.   **If the lab tests need quick action we will call you with the results.  The phone number we will call with results is # [phone] (home) . If this is not the best number please call our clinic and change the number.  Medication Refills:  If you need any refills please call your pharmacy and they will contact us.   If you need to  your refill at a new pharmacy, please contact the new pharmacy directly. The new pharmacy will help you get your medications transferred faster.   Scheduling:  If you have any concerns about today's visit or wish to schedule another appointment please call our office during normal business hours [phone] (8-5:00 M-F)  If a referral was made to a [facility] and you don't get a call from central scheduling please call [phone].  If a Mammogram was ordered for you at [facility] call [phone] to schedule or change your appointment.  If you had an XRay/CT/Ultrasound/MRI ordered the number is [phone] to schedule or change your radiology appointment.   Medical Concerns:  If you have urgent medical concerns please call [phone] at any time of the day.    MD SCARLET [facility] MEDICARE PERSONAL PREVENTIVE SERVICES PLAN - SERVICES     Review these tests with your doctor then decide which ones you want and take this page home for your reference                                                    IMMUNIZATIONS Description Recommend today?     Hepatitis B  If you have any  of the following risk factors you should be immunized for hepatitis B: severe kidney disease, people who live in the same house as a carrier of Hepatitis B virus, people who live in  institutions (e.g. nursing homes or group homes), homosexual men, patients with hemophilia who received Factor VIII or IX concentrates, abusers of illicit injectable drugs No; is up to date.     SCREENING TESTS     Description   Year of Last Screening   Recommended Today?   Heart disease screening blood tests    Cholesterol level Reducing cholesterol can reduce your risk of heart attacks by 25%.  Screening is recommended yearly if you are at risk of heart disease otherwise every 4-5 years unknown Recommend but patient not fasting   Diabetes screening tests    Hemoglobin A1c blood test   Finding and treating diabetes early can reduce complications.  Screening recommended/covered yearly if you have high blood pressure, high cholesterol, obesity (BMI >30), or a history of high blood glucose tests; or 2 of the following: family history of diabetes, overweight (BMI >25 but <30), age 65 years or older, and a history of diabetes of pregnancy or gave birth to baby weighing more than 9 lbs. unknown Recommend but patient not fasting   Hepatitis B screening Finding hepatitis B early can reduce complications.  Screening is recommended for persons with selected risk factors. unknown No; is up to date.   Hepatitis C screening Finding hepatitis C early can reduce complications.  Screening is recommended for all persons born from 1945 through 1965 and for those with selected other risk factors.  unknown Yes; Recommended and ordered.   HIV screening Finding HIV early can reduce complications.  Screening is recommended for persons with risk factors for HIV infection. unknown No: is not indicated today.   Glaucoma screening Early detection of glaucoma can prevent blindness.   Please talk to your eye doctor about this.       SCREENING TESTS     Description    Year of Last Screening   Recommended Today?   Colorectal cancer screening    Fecal occult blood test     Screening colonoscopy Screening for colon cancer has been shown to reduce death from colon cancer by 25-30%. Screening recommended to start at 50 years and continuing until age 75 years.   never Recommeded and patient declined.    Breast Cancer Screening (women)    Mammogram Mammogram screening for breast cancer has been shown to reduce the risk of dying from breast cancer and prolong life. Screening is recommended every 1-2 years for women aged 50 to 74 years.  NA NA   Cervical Cancer screening (women)    Pap Cervical pap smears can reduce cervical cancer. Screening is recommended annually if high risk (history of abnormal pap smears) otherwise every 2-3 years, stop screening at 65 years of age if history of normal paps. NA NA   Screening for Osteoporosis:  Bone mass measurements (women)    Dexa Scan Screening and treating Osteoporosis can reduce the risk of hip and spine fractures. Screening is recommended in women 65 years or older and in women and men at risk of osteoporosis. NA NA   Screening for Lung Cancer     Low-dose CT scanning Screening can reduce mortality in persons aged 55-80 who have smoked at least 30 pack-years and who are either still smoking or have quit in the past 15 years. never No: is not indicated today.   Abdominal Aortic Aneurysm (AAA) screening    Ultrasound (US)   An aneurysm treated before rupture is very safe -a ruptured aneurysm can be fatal.  Screening  by US for AAA is limited to patients who meet one of the following criteria:    Men who are 65-75 years old and have smoked more than 100 cigarettes in their lifetime    Anyone with a family history of abdominal aortic aneurysm never No: is not indicated today.             MEDICARE WELLNESS EXAM PATIENT INSTRUCTIONS    Yearly exam:     See your health care provider every year in order to review changes in your health, review  medicines that you take, and discuss preventive care needs such as immunizations and cancer screening.    Get a flu shot each year.     Advance Directives:    If you have not done so, you are encouraged to complete advance directives and/or a living will.   More information about advance directives can be found at: http://www.[place]med.org/advocacy/Key-Issues/Advance-Directives    Nutrition:     Eat at least 5 servings of fruits and vegetables each day.     Eat whole-grain bread, whole-wheat pasta and brown rice instead of white grains and rice.     Talk to your doctor about Calcium and Vitamin D.     Lifestyle:    Exercise for at least 150 minutes a week (30 minutes a day, 5 days a week). This will help you control your weight and prevent disease.     Limit alcohol to one drink per day.     If you smoke, try to quit - your doctor will be happy to help.     Wear sunscreen to prevent skin cancer.     See your dentist every six months for an exam and cleaning.     See your eye doctor every 1 to 2 years to screen for conditions such as glaucoma, macular degeneration and cataracts.

## 2018-03-20 NOTE — NURSING NOTE
name: Uriel CRUZ  Language: Turkish  Agency: [facility]  Phone number: [phone]

## 2018-03-20 NOTE — LETTER
March 22, 2018      Buck Delacruz  [address]   [place] [facility] [place] [address]        Dear Buck,    Thank you for getting your care at [facility]. Please see below for your test results.    Resulted Orders   M Tuberculosis by Quantiferon   Result Value Ref Range    M Tuberculosis Result Negative NEG^Negative    M Tuberculosis Antigen Value 0.01 IU/mL      Comment:      This is a qualitative test.  The TB antigen IU/mL value is required for   documentation on certain government reporting forms but this value should not   be used to monitor disease progression or response to therapy.  Diagnosing or excluding tuberculosis disease, and assessing the probability of   LTBI, require a combination of epidemiological, historical, medical and   diagnostic findings that should be taken into account when interpreting   QuantiFERON TB results.     Hepatitis C antibody   Result Value Ref Range    Hepatitis C Antibody Nonreactive NR^Nonreactive      Comment:      Assay performance characteristics have not been established for newborns,   infants, and children     Prostate spec antigen screen   Result Value Ref Range    PSA 0.88 0 - 4 ug/L      Comment:      Assay Method:  Chemiluminescence using Siemens Vista analyzer     Your TB test, Hepatitis C test, and Prostate test are all within normal limits.  Follow up with urology as we discussed.  I have faxed your paperwork to adult .    Sincerely,    Daryl Watts MD

## 2018-03-20 NOTE — PROGRESS NOTES
Subsequent Medicare Wellness Visit         HPI       This 68 year old male presents as an established patient  Daryl Stefanie who presents for a Subsequent Medicare Annual Wellness Exam.    Other issues patient wants to be addressed today:    Needs adult  form completed    A [employer]  was used for  this visit.     Patient Active Problem List   Diagnosis     Other microscopic hematuria     Bilateral upper abdominal pain       Past Medical History:   Diagnosis Date     Back injury 1969     Bilateral upper abdominal pain 2/14/2018     Wrist injury 1969        Family History   Problem Relation Age of Onset     DIABETES No family hx of      Coronary Artery Disease No family hx of      Hypertension No family hx of      Hyperlipidemia No family hx of      CEREBROVASCULAR DISEASE No family hx of          Problem List, Family History and past Medical History reviewed and unchanged/updated.       Health Risk Assessment / Review of Systems       Constitutional:   Fevers or night sweats?  NO      Eyes:   Vision problems?  NO            Hearing:  Do you feel you have hearing loss?  NO  Cardiovascular:  Chest pain, palpitations, or pain with walking?  NO        Respiratory:   Breathing problems or cough?  NO    :   Difficulty controlling urination?  NO        Musculoskeletal:   Stiffness or sore joints?  NO            Skin:   concerning lesions or moles?  NO           Nervous System:   loss of strength or sensation,  numbness or tingling,  tremor,  dizziness,  headache?  NO         Mental Health:   depression or anxiety,  sleep problems?  NO   Cognition:  Memory problems?  NO         PHQ-2 Score:     PHQ-2 ( 1999 Pfizer) 3/20/2018 2/14/2018   Q1: Little interest or pleasure in doing things 0 0   Q2: Feeling down, depressed or hopeless 0 0   PHQ-2 Score 0 0       PHQ-9 Score:  Not completed         Medical Care     What other specialists or organizations are involved in your medical care?    Patient Care Team        Relationship Specialty Notifications Start End    [facility] PCP - General Clinic  12/29/17     Phone: [phone] Fax: [phone]         [address] [facility] [address]    Gabriela Seals PA-C Physician Assistant Physician Assistant  2/19/18     Phone: [phone] Fax: [phone]         [address] [facility] [address]    Carmelina Loyd RN Registered Nurse   2/19/18                  Social History / Home Safety     Social History   Substance Use Topics     Smoking status: Never Smoker     Smokeless tobacco: Never Used     Alcohol use No     Marital Status:  Who lives in your household? self  Do you feel threatened or controlled by a partner, ex-partner or anyone in your life? No   Has anyone hurt you physically, for example by pushing, hitting, slapping or kicking you   or forcing you to have sex? No          Functional Status     Do you need help with dressing yourself, bathing, or walking?No     Do you need help with the phone, transportation, shopping, preparing meals, housework, laundry, medications or managing money?No       Risk Behaviors and Healthy Habits     History   Smoking Status     Never Smoker   Smokeless Tobacco     Never Used     How many servings of fruits and vegetables do you eat a day? Once in a while  Exercise:No exercise None  Do you frequently drive without a seatbelt? No   Do you use any other drugs? No       Do you use alcohol?No      Functional Ability     Unable to complete  Frailty screen score (3-5 = frail; consider interdisciplinary assessment and care.  1-2 = prefrail; at risk for adverse health events; 0 = robust):        EVALUATION OF COGNITIVE FUNCTION     Mood/affect:Normal  Appearance:Normal  Family member/caregiver input: Normal    Mini Cog Scoring   3 points   Clock Draw Test result:  Abnormal: does not understand - has never told time in his life  Cognitive screen is: normal     Other Assessments     CV Risk based on Pooled Cohort Risk  "(consider assessing every 4-6 years; consider statin in patients with 10-yr risk > 7.5%):  Not done as patient not fasting  The ASCVD Risk score (Danilo DC Jr, et al., 2013) failed to calculate for the following reasons:    Cannot find a previous HDL lab    Cannot find a previous total cholesterol lab    Advance Directives: Discussed with patient and family as appropriate.  Has patient completed advance directives and/or a living will?  no     Immunization History   Administered Date(s) Administered     HepB-Adult 01/25/2017, 03/10/2017, 02/21/2018     TD (ADULT, 7+) 01/25/2017, 03/10/2017     Reviewed Immunization Record Today         Physical Exam     Vitals: Ht 5' 7.52\" (171.5 cm)  Wt 133 lb (60.3 kg)  BMI 20.51 kg/m2  BMI= Body mass index is 20.51 kg/(m^2).  GENERAL APPEARANCE: alert and no distress.  Appears older than stated age  HENT: ear canals patent and TM's normal; mouth without ulcers or lesions; and oral mucous membranes moist  Neck:  Supple, no adenopathy.  DENTITION:  Poor   RESP: lungs clear to auscultation - no rales, rhonchi or wheezes  CV: regular rates and rhythm, no murmur, click or rub, no peripheral edema and peripheral pulses strong  ABDOMINAL:  Soft NT/ND without mass or HSM.  +BS  SKIN: no suspicious lesions or rashes  NEURO: Normal strength and tone  PSYCH:  Mentation normal.  Affect bright.        Assessment and Plan     ASSESSMENT / PLAN:  (Z00.00) Medicare annual wellness visit, subsequent  (primary encounter diagnosis)  Comment:  See table below.  Will readdress lipids, blood sugar when fasting.  Readdress colon cancer screening  Plan: M Tuberculosis by [employer], Hepatitis C         antibody        Also checked for TB today at request of adult day care.    (R31.29) Other microscopic hematuria  Comment:  chronic  Plan: check Prostate spec antigen screen        Had [facility]        Keep appointment for urology in 2 weeks     (Z23) Immunization " due  Comment:   Plan: ADMIN VACCINE, INITIAL, Pneumococcal vaccine 23        valent PPSV23  (Pneumovax) [24679]        Will update PCV13 in one year       Subsequent Medicare Annual Wellness Exam - reviewed Preventive Services and Plan form with patient as specified in Patient Instructions.    FEL[employer]O [employer]IN[employer] MEDICARE PERSONAL PREVENTIVE SERVICES PLAN - SERVICES     Review these tests with your doctor then decide which ones you want and take this page home for your reference                                                    IMMUNIZATIONS Description Recommend today?     Hepatitis B  If you have any of the following risk factors you should be immunized for hepatitis B: severe kidney disease, people who live in the same house as a carrier of Hepatitis B virus, people who live in  institutions (e.g. nursing homes or group homes), homosexual men, patients with hemophilia who received Factor VIII or IX concentrates, abusers of illicit injectable drugs No; is up to date.     SCREENING TESTS     Description   Year of Last Screening   Recommended Today?   Heart disease screening blood tests    Cholesterol level Reducing cholesterol can reduce your risk of heart attacks by 25%.  Screening is recommended yearly if you are at risk of heart disease otherwise every 4-5 years unknown Recommend but patient not fasting   Diabetes screening tests    Hemoglobin A1c blood test   Finding and treating diabetes early can reduce complications.  Screening recommended/covered yearly if you have high blood pressure, high cholesterol, obesity (BMI >30), or a history of high blood glucose tests; or 2 of the following: family history of diabetes, overweight (BMI >25 but <30), age 65 years or older, and a history of diabetes of pregnancy or gave birth to baby weighing more than 9 lbs. unknown Recommend but patient not fasting   Hepatitis B screening Finding hepatitis B early can reduce complications.  Screening is recommended for persons with selected  risk factors. unknown No; is up to date.   Hepatitis C screening Finding hepatitis C early can reduce complications.  Screening is recommended for all persons born from 1945 through 1965 and for those with selected other risk factors.  unknown Yes; Recommended and ordered.   HIV screening Finding HIV early can reduce complications.  Screening is recommended for persons with risk factors for HIV infection. unknown No: is not indicated today.   Glaucoma screening Early detection of glaucoma can prevent blindness.   Please talk to your eye doctor about this.       SCREENING TESTS     Description   Year of Last Screening   Recommended Today?   Colorectal cancer screening    Fecal occult blood test     Screening colonoscopy Screening for colon cancer has been shown to reduce death from colon cancer by 25-30%. Screening recommended to start at 50 years and continuing until age 75 years.   never Recommeded and patient declined.    Breast Cancer Screening (women)    Mammogram Mammogram screening for breast cancer has been shown to reduce the risk of dying from breast cancer and prolong life. Screening is recommended every 1-2 years for women aged 50 to 74 years.  NA NA   Cervical Cancer screening (women)    Pap Cervical pap smears can reduce cervical cancer. Screening is recommended annually if high risk (history of abnormal pap smears) otherwise every 2-3 years, stop screening at 65 years of age if history of normal paps. NA NA   Screening for Osteoporosis:  Bone mass measurements (women)    Dexa Scan Screening and treating Osteoporosis can reduce the risk of hip and spine fractures. Screening is recommended in women 65 years or older and in women and men at risk of osteoporosis. NA NA   Screening for Lung Cancer     Low-dose CT scanning Screening can reduce mortality in persons aged 55-80 who have smoked at least 30 pack-years and who are either still smoking or have quit in the past 15 years. never No: is not indicated  today.   Abdominal Aortic Aneurysm (AAA) screening    Ultrasound (US)   An aneurysm treated before rupture is very safe -a ruptured aneurysm can be fatal.  Screening  by US for AAA is limited to patients who meet one of the following criteria:    Men who are 65-75 years old and have smoked more than 100 cigarettes in their lifetime    Anyone with a family history of abdominal aortic aneurysm never No: is not indicated today.       Options for treatment and follow-up care were reviewed with the Buck MCCLAIN Jayme and/or guardian engaged in the decision making process and verbalized understanding of the options discussed and agreed with the final plan.    Daryl Watts MD, FAAFP

## 2018-03-22 ENCOUNTER — DOCUMENTATION ONLY (OUTPATIENT)
Dept: FAMILY MEDICINE | Facility: CLINIC | Age: 68
End: 2018-03-22

## 2018-03-22 LAB
M TB TUBERC IFN-G BLD QL: NEGATIVE
M TB TUBERC IFN-G/MITOGEN IGNF BLD: 0.01 IU/ML

## 2018-03-22 NOTE — PROGRESS NOTES
"When opening a documentation only encounter, be sure to enter in \"Chief Complaint\" Forms and in \" Comments\" Title of form, description if needed.    Buck is a 68 year old  male  Form received via: Fax  Form now resides in: Provider Ready    Mariola Champion CMA          Form has been completed by provider.     Form sent out via: Fax to [facility] at Fax Number: [phone]  Patient informed: n/a  Output date: March 22, 2018    Mariola Champion CMA      **Please close the encounter**      "

## 2018-04-23 ENCOUNTER — DOCUMENTATION ONLY (OUTPATIENT)
Dept: FAMILY MEDICINE | Facility: CLINIC | Age: 68
End: 2018-04-23

## 2018-05-02 NOTE — PROGRESS NOTES
Visit to the client's adult day service for health risk assessment. Clt is new to . An  was present.    Current situation/living environment  Visit was done at [facility] that clt attends per his request.  At today's visit he was fully dressed and well groomed.    Activities of daily living (ADL)/instrumental activities of daily living (IADL) and functional issues  Client needs help with the following ADL's: he is able to manage his ADLs  Client needs help with the following IADL's: shopping, cooking, housekeeping, laundry, managing finances/bills and transportation  Client states he is unable to perform the above due to language barrier and back pains      Health concerns for today  Clt said he was recently in the ER d/t flu like symptoms and had X ray done and was given IV fluids.  He said he feels much better not  Has patient fallen 2 or more times in the last year? No  Has patient fallen with injury in the last year? No    Cognition/mental health  Alert and oriented.  Pleasant and cheerful during out visit.  His family is supportive.    STARS/Med Adherence  Client is non-compliant with the following quality measures: Colon cancer screening  Comments: education efforts    Client's Plan of Care consists of:  Adult day care (3 days per week), Homemaker services (5 hours per week), Specialized supplies and equipment (cane) and Transportation    Usama Chang RN PHN  [facility] Managed care coordinator  [phone]

## 2018-05-14 ENCOUNTER — OFFICE VISIT (OUTPATIENT)
Dept: UROLOGY | Facility: CLINIC | Age: 68
End: 2018-05-14
Payer: COMMERCIAL

## 2018-05-14 VITALS
DIASTOLIC BLOOD PRESSURE: 75 MMHG | BODY MASS INDEX: 20.88 KG/M2 | HEIGHT: 67 IN | HEART RATE: 72 BPM | WEIGHT: 133 LBS | SYSTOLIC BLOOD PRESSURE: 128 MMHG

## 2018-05-14 DIAGNOSIS — R31.0 GROSS HEMATURIA: Primary | ICD-10-CM

## 2018-05-14 LAB
ALBUMIN UR-MCNC: NEGATIVE MG/DL
APPEARANCE UR: CLEAR
BILIRUB UR QL STRIP: NEGATIVE
COLOR UR AUTO: YELLOW
GLUCOSE UR STRIP-MCNC: NEGATIVE MG/DL
HGB UR QL STRIP: ABNORMAL
KETONES UR STRIP-MCNC: NEGATIVE MG/DL
LEUKOCYTE ESTERASE UR QL STRIP: NEGATIVE
MUCOUS THREADS #/AREA URNS LPF: PRESENT /LPF
NITRATE UR QL: NEGATIVE
PH UR STRIP: 5 PH (ref 5–7)
RBC #/AREA URNS AUTO: 30 /HPF (ref 0–2)
SOURCE: ABNORMAL
SP GR UR STRIP: 1.02 (ref 1–1.03)
UROBILINOGEN UR STRIP-MCNC: 0 MG/DL (ref 0–2)
WBC #/AREA URNS AUTO: 1 /HPF (ref 0–5)

## 2018-05-14 ASSESSMENT — PAIN SCALES - GENERAL: PAINLEVEL: SEVERE PAIN (6)

## 2018-05-14 NOTE — LETTER
5/14/2018       RE: Buck Delacruz  [address]  [facility] [address]     Dear Colleague,    Thank you for referring your patient, Buck Delacruz, to the [facility] UROLOGY AND INST FOR PROSTATE AND UROLOGIC CANCERS at [facility]. Please see a copy of my visit note below.    CC: gross hematuria.    HPI: It is a pleasure to see . Buck Delacruz, a very pleasant 68 year old male seen today in the urology clinic in consultation from Dr. Watts for evaluation of gross hematuria. Patient has noticed what looks like blood in his urine (bright red) for the past 3-4 months. This occurs intermittently. He often has bilateral flank and side pain when he sees the blood. He saw primary care in February where urinalysis confirmed hematuria with 25-50 RBC in the absence of any infectious indicators. He was then seen through an outside ED on 3/18/18 for complaints of hematuria and abdominal pain. UA revealed 3-5 RBC and CT urogram was negative for any obvious etiology for hematuria or abdominal pain (no stones, no hydro, no  mass). He is referred to urology for further evaluation.     The patient denies any smoking history. He also denies any dysuria, pyuria, hesitancy, intermittency, feelings of incomplete emptying, or any recent history of urinary tract infections or stones.    Review of Diagnostics:  2/14/18 - UA: 2+ blood, 25-50 RBC  3/18/18 - UA: small blood, 3-5 RBC    CT Urogram 3/18/18   (via Care Everywhere)  FINDINGS:  Visualized lung bases are clear. No nephrolithiasis. No cholelithiasis. Liver, gallbladder, biliary ducts, spleen, adrenals and pancreas appear normal. Kidneys enhance and excrete normally. No dilatation of the collecting system or ureters. Complete opacification of normal appearing ureters with right ureteral jet noted. No significant finding in the urinary bladder. Prostate size is normal. No inflammation or dilatation of large or small bowel appreciated.  Normal appendix. No air or fluid in the peritoneum. Abdominal wall is intact. Aorta is non aneurysmal. No pathologic adenopathy. Moderate osteoarthritis with vacuum disk at L5-S1. Mild diffuse facet arthrosis lumbar spine.  Mild degenerative arthritis sacroiliac joints.  Low-grade osteoarthritis hip joint space narrowing.     IMPRESSION:  1. No source for hematuria.  2. Mild osteoarthritis sacroiliac joints. Mild degenerative facet arthrosis lumbar spine and L5-S1 disc disease. Low-grade osteoarthritis both hips.    US RENAL COMPLETE, 12/29/2017   IMPRESSION:   Normal renal ultrasound.    PSA   Date Value Ref Range Status   03/20/2018 0.88 0 - 4 ug/L Final     Comment:     Assay Method:  Chemiluminescence using Siemens Vista analyzer       Creatinine   Date Value Ref Range Status   02/14/2018 0.7 0.7 - 1.3 mg/dL Final       Hematuria Risk Factors:  Age >40: yes  Smoking history: no  Occupational exposure to chemicals or dyes (ie, benzenes, aromatic amines): no  History of urologic disorder or disease: no  History of irritative voiding symptoms: no  History of urinary tract infection: no  Analgesic abuse: no  History of pelvic irradiation: no    Past Medical History:   Diagnosis Date     Back injury 1969     Bilateral upper abdominal pain 2/14/2018     Wrist injury 1969     Past Surgical History:   Procedure Laterality Date     NO HISTORY OF SURGERY       Current Outpatient Prescriptions   Medication Sig Dispense Refill     acetaminophen (TYLENOL) 325 MG tablet Take 2 tablets (650 mg) by mouth every 6 hours as needed for pain 100 tablet 1     cyclobenzaprine (FLEXERIL) 10 MG tablet Take 0.5-1 tablets (5-10 mg) by mouth 3 times daily as needed for muscle spasms 30 tablet 1     No Known Allergies  FAMILY HISTORY: There is no reported history of genitourinary carcinoma.  There is no history of urolithiasis.      SOCIAL HISTORY: The patient does not smoke cigarettes, no EtOH and no illicit drug use.    ROS: A  "comprehensive 14 point ROS was obtained and was negative except for that outlined above in the HPI.    PHYSICAL EXAM:   Vitals:    05/14/18 1352   BP: 128/75   Pulse: 72   Weight: 60.3 kg (133 lb)   Height: 1.702 m (5' 7\")     GENERAL: Well groomed, well developed, well nourished male in NAD.  HEENT: AT, NC, EOMI bilaterally.  SKIN: Warm to touch, dry.  No visible rashes or lesions on examined areas.  RESP: No increased respiratory effort.  MS: Full ROM in extremities.  : Deferred for now.  KEHINDE: Deferred for now.  NEURO: Alert and oriented x 3.  PSYCH: Normal mood and affect, pleasant and agreeable during interview and exam.    LABS: see above    IMAGING: see above    ASSESSMENT and PLAN:    Mr. Buck Delacruz is a pleasant 68 year old male with gross hematuria that has been ongoing for 3-4 months. This has been confirmed on at least 2 recent urinalyses. Recent CT urogram through outside ED was negative - no stones, hydro, obvious mass, etc.  The differential diagnosis at this point includes infection, BPH, prostatitis, urothelial malignancy, renal disorder versus another yet unknown diagnosis.    At this time, recommend proceeding with remainder of comprehensive hematuria evaluation to include:  - Urinalysis and urine culture to rule out an acute urinary tract infection.   - Urine cytology to look for cells concerning for malignancy.  - Cystoscopy with the first available urologist to evaluate the interior of the bladder. Follow up for hematuria as recommended by urologist performing cystoscopic evaluation.    Thank you for allowing me to participate in Mr. Delacruz's care.      About 20 minutes were spent with the patient today, > 50% in counseling and coordination of care.    Today's visit was conducted with the aid of a profession [place] .    Gabriela Seals PA-C  Department of Urology        "

## 2018-05-14 NOTE — PROGRESS NOTES
CC: gross hematuria.    HPI: It is a pleasure to see Mr. Buck Delacruz, a very pleasant 68 year old male seen today in the urology clinic in consultation from Dr. Watts for evaluation of gross hematuria. Patient has noticed what looks like blood in his urine (bright red) for the past 3-4 months. This occurs intermittently. He often has bilateral flank and side pain when he sees the blood. He saw primary care in February where urinalysis confirmed hematuria with 25-50 RBC in the absence of any infectious indicators. He was then seen through an outside ED on 3/18/18 for complaints of hematuria and abdominal pain. UA revealed 3-5 RBC and CT urogram was negative for any obvious etiology for hematuria or abdominal pain (no stones, no hydro, no  mass). He is referred to urology for further evaluation.     The patient denies any smoking history. He also denies any dysuria, pyuria, hesitancy, intermittency, feelings of incomplete emptying, or any recent history of urinary tract infections or stones.    Review of Diagnostics:  2/14/18 - UA: 2+ blood, 25-50 RBC  3/18/18 - UA: small blood, 3-5 RBC    CT Urogram 3/18/18   (via Care Everywhere)  FINDINGS:  Visualized lung bases are clear. No nephrolithiasis. No cholelithiasis. Liver, gallbladder, biliary ducts, spleen, adrenals and pancreas appear normal. Kidneys enhance and excrete normally. No dilatation of the collecting system or ureters. Complete opacification of normal appearing ureters with right ureteral jet noted. No significant finding in the urinary bladder. Prostate size is normal. No inflammation or dilatation of large or small bowel appreciated. Normal appendix. No air or fluid in the peritoneum. Abdominal wall is intact. Aorta is non aneurysmal. No pathologic adenopathy. Moderate osteoarthritis with vacuum disk at L5-S1. Mild diffuse facet arthrosis lumbar spine.  Mild degenerative arthritis sacroiliac joints.  Low-grade osteoarthritis hip joint space  "narrowing.     IMPRESSION:  1. No source for hematuria.  2. Mild osteoarthritis sacroiliac joints. Mild degenerative facet arthrosis lumbar spine and L5-S1 disc disease. Low-grade osteoarthritis both hips.    US RENAL COMPLETE, 12/29/2017   IMPRESSION:   Normal renal ultrasound.    PSA   Date Value Ref Range Status   03/20/2018 0.88 0 - 4 ug/L Final     Comment:     Assay Method:  Chemiluminescence using Siemens Vista analyzer       Creatinine   Date Value Ref Range Status   02/14/2018 0.7 0.7 - 1.3 mg/dL Final       Hematuria Risk Factors:  Age >40: yes  Smoking history: no  Occupational exposure to chemicals or dyes (ie, benzenes, aromatic amines): no  History of urologic disorder or disease: no  History of irritative voiding symptoms: no  History of urinary tract infection: no  Analgesic abuse: no  History of pelvic irradiation: no    Past Medical History:   Diagnosis Date     Back injury 1969     Bilateral upper abdominal pain 2/14/2018     Wrist injury 1969     Past Surgical History:   Procedure Laterality Date     NO HISTORY OF SURGERY       Current Outpatient Prescriptions   Medication Sig Dispense Refill     acetaminophen (TYLENOL) 325 MG tablet Take 2 tablets (650 mg) by mouth every 6 hours as needed for pain 100 tablet 1     cyclobenzaprine (FLEXERIL) 10 MG tablet Take 0.5-1 tablets (5-10 mg) by mouth 3 times daily as needed for muscle spasms 30 tablet 1     No Known Allergies  FAMILY HISTORY: There is no reported history of genitourinary carcinoma.  There is no history of urolithiasis.      SOCIAL HISTORY: The patient does not smoke cigarettes, no EtOH and no illicit drug use.    ROS: A comprehensive 14 point ROS was obtained and was negative except for that outlined above in the HPI.    PHYSICAL EXAM:   Vitals:    05/14/18 1352   BP: 128/75   Pulse: 72   Weight: 60.3 kg (133 lb)   Height: 1.702 m (5' 7\")     GENERAL: Well groomed, well developed, well nourished male in NAD.  HEENT: AT, NC, EOMI " bilaterally.  SKIN: Warm to touch, dry.  No visible rashes or lesions on examined areas.  RESP: No increased respiratory effort.  MS: Full ROM in extremities.  : Deferred for now.  KEHINDE: Deferred for now.  NEURO: Alert and oriented x 3.  PSYCH: Normal mood and affect, pleasant and agreeable during interview and exam.    LABS: see above    IMAGING: see above    ASSESSMENT and PLAN:    Mr. Buck Delacruz is a pleasant 68 year old male with gross hematuria that has been ongoing for 3-4 months. This has been confirmed on at least 2 recent urinalyses. Recent CT urogram through outside ED was negative - no stones, hydro, obvious mass, etc.  The differential diagnosis at this point includes infection, BPH, prostatitis, urothelial malignancy, renal disorder versus another yet unknown diagnosis.    At this time, recommend proceeding with remainder of comprehensive hematuria evaluation to include:  - Urinalysis and urine culture to rule out an acute urinary tract infection.   - Urine cytology to look for cells concerning for malignancy.  - Cystoscopy with the first available urologist to evaluate the interior of the bladder. Follow up for hematuria as recommended by urologist performing cystoscopic evaluation.    Thank you for allowing me to participate in Mr. Delacruz's care.      About 20 minutes were spent with the patient today, > 50% in counseling and coordination of care.    Today's visit was conducted with the aid of a profession [place] .    Gabriela Seals PA-C  Department of Urology

## 2018-05-14 NOTE — MR AVS SNAPSHOT
After Visit Summary   5/14/2018    Buck Delacruz    MRN: [id number]           Patient Information     Date Of Birth          1950        Visit Information        Provider Department      5/14/2018 1:45 PM Gabriela Seals PA-C; [employer] [facility] Urology and [facility] for Prostate and Urologic Cancers        Today's Diagnoses     Gross hematuria    -  1      Care Instructions    UROLOGY CLINIC VISIT PATIENT INSTRUCTIONS    1) Schedule next available cystoscopy with any available urologist for gross hematuria.    CYSTOSCOPY    What is a Cystoscopy?  This is a procedure done to check for problems inside the bladder.  Problems may include polyps (growths), tumors, inflammation (swelling and redness) and other concerns.    The doctor inserts a thin tube (called a cystoscope) into the bladder.  The tube is about the size of a pencil.  We will give you numbing medicine to reduce the pain or discomfort you may feel.    The tube allows the doctor to:  The doctor will be able to see inside the bladder by filling the bladder with water.  The water makes it easier to see any problems that may be present.    If needed, the doctor may use the tube to:  The doctor is able to take tissue samples (biopsies).  Samples are sent to the lab for testing.  The doctor can also burn off any small growths or tumors that are found.  This is call fulguration.    How should I get ready for the exam?  To prepare, stop taking any medications as instructed. Ask whether you should avoid eating or drinking anything after midnight before the procedure. Follow any other instructions your doctor gives you.    If you are having this procedure done at the clinic, you will be there for up to an hour.  You will receive care before and after the procedure.    Please tell your doctor if:  - You have a history of urinary tract infections.  - You know that you have a tumor in your bladder.  - You have bleeding  problems.  - You have any allergies.  - You are or may be pregnant.      What happens after the exam?  You may go back to your normal diet and activity as you feel ready, unless your doctor tells you not to.    For the next two days, you may notice:  - Some blood in your urine.  - Some burning when you urinate (use the toilet).  - An urge to urinate more often.  - Bladder spasms.    These are normal after the procedure. They should go away on their own after a day or two.      - You can help to relieve the above listed symptoms by:  - Drinking 6 to 8 large glasses of water each day (includes drinks at meals).  This will help clear the urine.  - Take warm baths to relieve pain and bladder spasms.  Do not add anything to the bath water.  - Your doctor may prescribe pain medicine.  You may also take Tylenol (acetaminophen) for pain.    When should I call my doctor?  - A fever over 100.0 F (38 C) for more than a day.  (Before you call the doctor, check your temperature under your tongue.)  - Chills.  - Failure to urinate: No urine comes out when you try to use the toilet.  (Try soaking in a bathtub full of warm water.  If still no urine, call your doctor.)  - A lot of blood in the urine or blood clots larger than a nickel.  - Pain in the back or abdomen (belly / stomach area).  - Pain or spasms that are not relieved by warm tub baths and pain medicine.  - Severe pain, burning or other problems while passing urine.  - Pain that gets worse after two days.      If you have any issues, questions or concerns in the meantime, do not hesitate to contact us at [phone] or via [employer].     It was a pleasure meeting with you today.  Thank you for allowing me and my team the privilege of caring for you today.  YOU are the reason we are here, and I truly hope we provided you with the excellent service you deserve.  Please let us know if there is anything else we can do for you so that we can be sure you are leaving completely  "satisfied with your care experience.                Follow-ups after your visit        Your next 10 appointments already scheduled     2018 10:20 AM CDT   (Arrive by 10:05 AM)   Cystoscopy with Kieran Varner MD   [facility] Urology and [facility] for Prostate and Urologic Cancers ([facility])    [address] [address] [phone]              Who to contact     Please call your clinic at [phone] to:    Ask questions about your health    Make or cancel appointments    Discuss your medicines    Learn about your test results    Speak to your doctor            Additional Information About Your Visit        MyChart Information     [employer] is an electronic gateway that provides easy, online access to your medical records. With [employer], you can request a clinic appointment, read your test results, renew a prescription or communicate with your care team.     To sign up for [employer] visit the website at [email or web address]   You will be asked to enter the access code listed below, as well as some personal information. Please follow the directions to create your username and password.     Your access code is: [email or web address]  Expires: 5/15/2018 10:55 AM     Your access code will  in 90 days. If you need help or a new code, please contact your [facility] or call [phone] for assistance.        Care EveryWhere ID     This is your Care EveryWhere ID. This could be used by other organizations to access your [place] medical records  [id number]        Your Vitals Were     Pulse Height BMI (Body Mass Index)             72 1.702 m (5' 7\") 20.83 kg/m2          Blood Pressure from Last 3 Encounters:   18 128/75   18 133/81   18 129/79    Weight from Last 3 Encounters:   18 60.3 kg (133 lb)   18 60.3 kg (133 lb)   18 63.8 kg (140 lb 9.6 oz)              We Performed the " Following     Cytology non gyn     Routine UA with microscopic - No culture     Urine Culture Aerobic Bacterial        Primary Care Provider Office Phone # Fax #    Daryl Watts [phone]       [address] [facility] [address]        Equal Access to Services     SITA CASSIDY : Hadii christie ku ashleyo Soomaali, waaxda luqadaha, qaybta kaalmada adejonah, nhung sterling laChangaleah mansfield. So [facility] [phone].    ATENCIÓN: Si habla español, tiene a wilcox disposición servicios gratuitos de asistencia lingüística. Llame al [phone].    We comply with applicable federal civil rights laws and [place] laws. We do not discriminate on the basis of race, color, national origin, age, disability, sex, sexual orientation, or gender identity.            Thank you!     Thank you for choosing [facility] UROLOGY AND [facility] FOR PROSTATE AND UROLOGIC CANCERS  for your care. Our goal is always to provide you with excellent care. Hearing back from our patients is one way we can continue to improve our services. Please take a few minutes to complete the written survey that you may receive in the mail after your visit with us. Thank you!             Your Updated Medication List - Protect others around you: Learn how to safely use, store and throw away your medicines at www.disposemymeds.org.          This list is accurate as of 5/14/18  2:20 PM.  Always use your most recent med list.                   Brand Name Dispense Instructions for use Diagnosis    acetaminophen 325 MG tablet    TYLENOL    100 tablet    Take 2 tablets (650 mg) by mouth every 6 hours as needed for pain    Bilateral upper abdominal pain       cyclobenzaprine 10 MG tablet    FLEXERIL    30 tablet    Take 0.5-1 tablets (5-10 mg) by mouth 3 times daily as needed for muscle spasms    Chronic bilateral low back pain without sciatica

## 2018-05-14 NOTE — NURSING NOTE
Chief Complaint   Patient presents with     Consult     microscopic hematuria        Jessica Diamond MA

## 2018-05-14 NOTE — PATIENT INSTRUCTIONS
UROLOGY CLINIC VISIT PATIENT INSTRUCTIONS    1) Schedule next available cystoscopy with any available urologist for gross hematuria.    CYSTOSCOPY    What is a Cystoscopy?  This is a procedure done to check for problems inside the bladder.  Problems may include polyps (growths), tumors, inflammation (swelling and redness) and other concerns.    The doctor inserts a thin tube (called a cystoscope) into the bladder.  The tube is about the size of a pencil.  We will give you numbing medicine to reduce the pain or discomfort you may feel.    The tube allows the doctor to:  The doctor will be able to see inside the bladder by filling the bladder with water.  The water makes it easier to see any problems that may be present.    If needed, the doctor may use the tube to:  The doctor is able to take tissue samples (biopsies).  Samples are sent to the lab for testing.  The doctor can also burn off any small growths or tumors that are found.  This is call fulguration.    How should I get ready for the exam?  To prepare, stop taking any medications as instructed. Ask whether you should avoid eating or drinking anything after midnight before the procedure. Follow any other instructions your doctor gives you.    If you are having this procedure done at the clinic, you will be there for up to an hour.  You will receive care before and after the procedure.    Please tell your doctor if:  - You have a history of urinary tract infections.  - You know that you have a tumor in your bladder.  - You have bleeding problems.  - You have any allergies.  - You are or may be pregnant.      What happens after the exam?  You may go back to your normal diet and activity as you feel ready, unless your doctor tells you not to.    For the next two days, you may notice:  - Some blood in your urine.  - Some burning when you urinate (use the toilet).  - An urge to urinate more often.  - Bladder spasms.    These are normal after the procedure. They  should go away on their own after a day or two.      - You can help to relieve the above listed symptoms by:  - Drinking 6 to 8 large glasses of water each day (includes drinks at meals).  This will help clear the urine.  - Take warm baths to relieve pain and bladder spasms.  Do not add anything to the bath water.  - Your doctor may prescribe pain medicine.  You may also take Tylenol (acetaminophen) for pain.    When should I call my doctor?  - A fever over 100.0 F (38 C) for more than a day.  (Before you call the doctor, check your temperature under your tongue.)  - Chills.  - Failure to urinate: No urine comes out when you try to use the toilet.  (Try soaking in a bathtub full of warm water.  If still no urine, call your doctor.)  - A lot of blood in the urine or blood clots larger than a nickel.  - Pain in the back or abdomen (belly / stomach area).  - Pain or spasms that are not relieved by warm tub baths and pain medicine.  - Severe pain, burning or other problems while passing urine.  - Pain that gets worse after two days.      If you have any issues, questions or concerns in the meantime, do not hesitate to contact us at [phone] or via [employer].     It was a pleasure meeting with you today.  Thank you for allowing me and my team the privilege of caring for you today.  YOU are the reason we are here, and I truly hope we provided you with the excellent service you deserve.  Please let us know if there is anything else we can do for you so that we can be sure you are leaving completely satisfied with your care experience.

## 2018-05-15 LAB
BACTERIA SPEC CULT: NO GROWTH
COPATH REPORT: NORMAL
Lab: NORMAL
SPECIMEN SOURCE: NORMAL

## 2018-06-11 ENCOUNTER — PRE VISIT (OUTPATIENT)
Dept: UROLOGY | Facility: CLINIC | Age: 68
End: 2018-06-11

## 2018-06-11 NOTE — TELEPHONE ENCOUNTER
Hematuria evaluation with cystoscopy.  Referred by Gabriela Seals PA-C.  Records available in [facility].

## 2018-06-14 ENCOUNTER — OFFICE VISIT (OUTPATIENT)
Dept: UROLOGY | Facility: CLINIC | Age: 68
End: 2018-06-14
Payer: COMMERCIAL

## 2018-06-14 VITALS
HEART RATE: 55 BPM | BODY MASS INDEX: 20.72 KG/M2 | DIASTOLIC BLOOD PRESSURE: 76 MMHG | WEIGHT: 132.3 LBS | SYSTOLIC BLOOD PRESSURE: 129 MMHG

## 2018-06-14 DIAGNOSIS — N40.0 BENIGN PROSTATIC HYPERPLASIA, UNSPECIFIED WHETHER LOWER URINARY TRACT SYMPTOMS PRESENT: ICD-10-CM

## 2018-06-14 DIAGNOSIS — R10.11 BILATERAL UPPER ABDOMINAL PAIN: ICD-10-CM

## 2018-06-14 DIAGNOSIS — R31.29 OTHER MICROSCOPIC HEMATURIA: Primary | ICD-10-CM

## 2018-06-14 DIAGNOSIS — R10.12 BILATERAL UPPER ABDOMINAL PAIN: ICD-10-CM

## 2018-06-14 RX ORDER — FINASTERIDE 5 MG/1
5 TABLET, FILM COATED ORAL DAILY
Qty: 90 TABLET | Refills: 3 | Status: SHIPPED | OUTPATIENT
Start: 2018-06-14 | End: 2023-01-02

## 2018-06-14 ASSESSMENT — PAIN SCALES - GENERAL
PAINLEVEL: SEVERE PAIN (6)
PAINLEVEL: MODERATE PAIN (4)

## 2018-06-14 NOTE — LETTER
July 10, 2018       TO: Waylon Delacruz  [address]  [facility] [address]       DearMr.Jayme,    We are writing to inform you of your test results.    Your test results fall within the expected range(s) or remain unchanged from previous results.  Please continue with current treatment plan.    Resulted Orders   FISH BLADDER CANCER   Result Value Ref Range    Copath Report       Patient Name: WAYLON DELACRUZ  MR#: [id number]  Specimen #: [id number]  Collected: 6/14/2018  Received: 6/14/2018  Reported: 6/21/2018 13:44  Ordering Phy(s): BENNIE STACK WEIGHT  Additional Phy(s): EDYTA MONSALVE    For improved result formatting, select 'View Enhanced Report Format' under   Linked Documents section.    SPECIMEN/STAIN PROCESS:  FISH UroVysion, Urine       FISH-CEP 3 x 1, FISH-CEP 7 x 1, FISH-CEP 17 x 1, FISH-LCI 9P21 x 1,   UroVysion x 1    ----------------------------------------------------------------    CYTOLOGIC INTERPRETATION:    FISH UroVysion, Urine:   Negative UroVysion test.  Specimen Adequacy: Satisfactory for evaluation.    I have personally reviewed all specimens and/or slides, including the   listed special stains, and used them  with my medical judgement to determine or confirm the final diagnosis.    Electronically signed out by:  Madan Paz M.D., [facility]    Processed and screened at [facility],   [facility]    CLINICAL HISTORY:  The patient is a 68-year-old male with a history of gross hematuria.    ,    GROSS:  FISH UroVysion, Urine: Received 70 ml of yellow, clear fluid, processed as   1 slide made for UroVysion FISH.    Negative: No interphase cytogenetic changes consistent with urothelial   carcinoma by using the UroVysion FISH  probe set. This test result does not rule out the possibility that the   patient may have a low-grade  non-invasive papillary urothelial carcinoma.    METHODS:    Florescence in-situ hybridization  (FISH) was performed on urine using the   [employer] UroVysion DNA probe set to the  centromere region of chromosome 3, 7, and 17 and the 9p21 locus. At least   25 consecutive non-inflammatory  cells were scored. [facility],   validated the performance characteristics  of this assay.    CPT Codes:  A: 69966-PMBHY    TESTING LAB LOCATION:  [address]   [address] [phone]    COLLECTION SITE:  Client:  [facility]  Location:  UCUROP (B)    Resident  [id number]         Thank you for choosing [facility] for your care. Please follow up as previously planned.  Please call with any questions or concerns.    Thank you,  Lynda Cleary, RN Care Coordinator for  Dr. Alcaraz Weight

## 2018-06-14 NOTE — MR AVS SNAPSHOT
After Visit Summary   2018    Buck Delacruz    MRN: [id number]           Patient Information     Date Of Birth          1950        Visit Information        Provider Department      2018 10:25 AM Kieran Varner MD; [employer] [facility] Urology and [facility] for Prostate and Urologic Cancers        Today's Diagnoses     Benign prostatic hyperplasia, unspecified whether lower urinary tract symptoms present    -  1    Other microscopic hematuria        Bilateral upper abdominal pain           Follow-ups after your visit        Your next 10 appointments already scheduled     Dec 20, 2018  8:00 AM CST   (Arrive by 7:45 AM)   Return Visit with Kieran Varner MD   [facility] Urology and [facility] for Prostate and Urologic Cancers ([facility] [place])    [address] [facility]  [address] [facility] [address] [phone]              Who to contact     Please call your clinic at [phone] to:    Ask questions about your health    Make or cancel appointments    Discuss your medicines    Learn about your test results    Speak to your doctor            Additional Information About Your Visit        MyChart Information     [employer] is an electronic gateway that provides easy, online access to your medical records. With [employer], you can request a clinic appointment, read your test results, renew a prescription or communicate with your care team.     To sign up for [employer] visit the website at [email or web address]   You will be asked to enter the access code listed below, as well as some personal information. Please follow the directions to create your username and password.     Your access code is: [email or web address]  Expires: 2018  6:30 AM     Your access code will  in 90 days. If you need help or a new code, please contact your [facility] or call [phone] for assistance.        Care EveryWhere ID      This is your Care EveryWhere ID. This could be used by other organizations to access your [place] medical records  [id number]        Your Vitals Were     Pulse BMI (Body Mass Index)                55 20.72 kg/m2           Blood Pressure from Last 3 Encounters:   06/14/18 129/76   05/14/18 128/75   03/20/18 133/81    Weight from Last 3 Encounters:   06/14/18 60 kg (132 lb 4.8 oz)   05/14/18 60.3 kg (133 lb)   03/20/18 60.3 kg (133 lb)              We Performed the Following     FISH BLADDER CANCER          Today's Medication Changes          These changes are accurate as of 6/14/18 11:47 AM.  If you have any questions, ask your nurse or doctor.               Start taking these medicines.        Dose/Directions    finasteride 5 MG tablet   Commonly known as:  PROSCAR   Used for:  Benign prostatic hyperplasia, unspecified whether lower urinary tract symptoms present   Started by:  Kieran Varner MD        Dose:  5 mg   Take 1 tablet (5 mg) by mouth daily   Quantity:  90 tablet   Refills:  3            Where to get your medicines      Some of these will need a paper prescription and others can be bought over the counter.  Ask your nurse if you have questions.     Bring a paper prescription for each of these medications     finasteride 5 MG tablet                Primary Care Provider Office Phone # Fax #    Daryl Ryan Watts [phone]       [address] [facility] [address]        Equal Access to Services     SITA CASSIDY AH: Juliana Arshad, waaxda lualf, qaybta kaalnhung ramos. So [facility] [phone].    ATENCIÓN: Si habla español, tiene a wilcox disposición servicios gratuitos de asistencia lingüística. Sukh ferguson [phone].    We comply with applicable federal civil rights laws and [place] laws. We do not discriminate on the basis of race, color, national origin, age, disability, sex, sexual orientation, or gender  identity.            Thank you!     Thank you for choosing [facility] UROLOGY AND [facility] FOR PROSTATE AND UROLOGIC CANCERS  for your care. Our goal is always to provide you with excellent care. Hearing back from our patients is one way we can continue to improve our services. Please take a few minutes to complete the written survey that you may receive in the mail after your visit with us. Thank you!             Your Updated Medication List - Protect others around you: Learn how to safely use, store and throw away your medicines at www.disposemymeds.org.          This list is accurate as of 6/14/18 11:47 AM.  Always use your most recent med list.                   Brand Name Dispense Instructions for use Diagnosis    acetaminophen 325 MG tablet    TYLENOL    100 tablet    Take 2 tablets (650 mg) by mouth every 6 hours as needed for pain    Bilateral upper abdominal pain       cyclobenzaprine 10 MG tablet    FLEXERIL    30 tablet    Take 0.5-1 tablets (5-10 mg) by mouth 3 times daily as needed for muscle spasms    Chronic bilateral low back pain without sciatica       finasteride 5 MG tablet    PROSCAR    90 tablet    Take 1 tablet (5 mg) by mouth daily    Benign prostatic hyperplasia, unspecified whether lower urinary tract symptoms present

## 2018-06-14 NOTE — PROGRESS NOTES
Chief Complaint:   Gross Hematuria         Consult or Referral:     Mr. Buck Delacruz is a 68 year old male seen in consultation from Gabriela Seals PA-C         History of Present Illness:    Buck Delacruz is a very pleasant 68 year old male who presents with a history of gross  hematuria.      He saw Gabriela Seals PA-C approximately 1 month ago. At that time, patient had intermittent hematuria for the past 3-4 months accompanied by bilateral flank and side pain. UA in 2/2018 confirmed hematuria with 25-50 RBC in the absence of any infectious indicators.     CT Urogram 3/18/18 was negative.   Urine cytology from 5/14/2018 was negative.     Distant history of back fracture per patient    He reports difficulty voiding/incomplete emptying          Past Medical History:     Past Medical History:   Diagnosis Date     Back injury 1969     Bilateral upper abdominal pain 2/14/2018     Wrist injury 1969            Past Surgical History:     Past Surgical History:   Procedure Laterality Date     NO HISTORY OF SURGERY              Medications     Current Outpatient Prescriptions   Medication     finasteride (PROSCAR) 5 MG tablet     acetaminophen (TYLENOL) 325 MG tablet     cyclobenzaprine (FLEXERIL) 10 MG tablet     No current facility-administered medications for this visit.             Family History:     Family History   Problem Relation Age of Onset     Diabetes No family hx of      Coronary Artery Disease No family hx of      Hypertension No family hx of      Hyperlipidemia No family hx of      Cerebrovascular Disease No family hx of             Social History:     Social History     Social History     Marital status: Single     Spouse name: N/A     Number of children: 9     Years of education: N/A     Occupational History     Not on file.     Social History Main Topics     Smoking status: Never Smoker     Smokeless tobacco: Never Used     Alcohol use No     Drug use: No     Sexual activity: Not Currently      Other Topics Concern     Not on file     Social History Narrative    Moved to [place] from [place] in June 2017            Allergies:   Review of patient's allergies indicates no known allergies.         Review of Systems:  From intake questionnaire     Negative 14 system review except as noted on HPI, nurse's note.         Physical Exam:     Patient is a 68 year old  male   Vitals: Blood pressure 129/76, pulse 55, weight 60 kg (132 lb 4.8 oz).  General Appearance Adult: Alert, no acute distress, oriented  Lungs: no respiratory distress, or pursed lip breathing  Heart: No obvious jugular venous distension present  Abdomen: soft, nontender, no organomegaly or masses, Body mass index is 20.72 kg/(m^2).,   Musculoskeltal: extremities normal, no peripheral edema  Skin: no visible suspicious lesions or rashes  Neuro: Alert, oriented, speech and mentation normal  Psych: affect and mood normal  : deferred to cystoscopy    CYSTOSCOPY PROCEDURE:  After sterile preparation and draping of the patient,  a 16-Maori flexible cystoscope was introduced via the urethra.  It was passed without difficulty into the bladder.  The urethra was open without evidence of stricture.  The ureteral orifices were orthotopic.  The bladder mucosa was evaluated using both antegrade and retroflex views and this showed no evidence of any lesions or trabeculation.  There were no stones. The prostate was moderately enlarged.       Labs and Pathology:    I reviewed all applicable laboratory and pathology data and went over findings with patient  Significant for   Lab Results   Component Value Date    CR 0.7 02/14/2018       Results for orders placed or performed in visit on 05/14/18   Routine UA with microscopic - No culture   Result Value Ref Range    Color Urine Yellow     Appearance Urine Clear     Glucose Urine Negative NEG^Negative mg/dL    Bilirubin Urine Negative NEG^Negative    Ketones Urine Negative NEG^Negative mg/dL    Specific Gravity  Urine 1.018 1.003 - 1.035    Blood Urine Large (A) NEG^Negative    pH Urine 5.0 5.0 - 7.0 pH    Protein Albumin Urine Negative NEG^Negative mg/dL    Urobilinogen mg/dL 0.0 0.0 - 2.0 mg/dL    Nitrite Urine Negative NEG^Negative    Leukocyte Esterase Urine Negative NEG^Negative    Source Midstream Urine     WBC Urine 1 0 - 5 /HPF    RBC Urine 30 (H) 0 - 2 /HPF    Mucous Urine Present (A) NEG^Negative /LPF     No results found for this or any previous visit.        Imaging:    I reviewed all applicable imaging and went over findings with patient.    Significant for CT Urogram 3/18/18:    FINDINGS:  Visualized lung bases are clear. No nephrolithiasis. No cholelithiasis. Liver, gallbladder, biliary ducts, spleen, adrenals and pancreas appear normal. Kidneys enhance and excrete normally. No dilatation of the collecting system or ureters. Complete opacification of normal appearing ureters with right ureteral jet noted. No significant finding in the urinary bladder. Prostate size is normal. No inflammation or dilatation of large or small bowel appreciated. Normal appendix. No air or fluid in the peritoneum. Abdominal wall is intact. Aorta is non aneurysmal. No pathologic adenopathy. Moderate osteoarthritis with vacuum disk at L5-S1. Mild diffuse facet arthrosis lumbar spine.  Mild degenerative arthritis sacroiliac joints.  Low-grade osteoarthritis hip joint space narrowing.     IMPRESSION:  1. No source for hematuria.  2. Mild osteoarthritis sacroiliac joints. Mild degenerative facet arthrosis lumbar spine and L5-S1 disc disease. Low-grade osteoarthritis both hips.      Outside and Past Medical records:    I spent 10 minutes reviewing outside and past medical records.       Standardized Questionnaire:      Not completed today.         Assessment and Plan:     Assessment:  67 yo M w/ hx of gross hematuria    - Cytoscopy today was normal. Prior CT Urogram and urine cytology were negative.  - If hematuria continues, repeat  workup in 2-3 years   - Discussed trial of Finasteride given this patient's enlarged prostate and current voiding symptoms. We discussed the associated risks, benefits, and side effects.  Patient was interested in a trial of Finasteride - will prescribe this.   - Follow up in 6 months     Orders  Orders Placed This Encounter   Procedures     FISH BLADDER CANCER     Scribe Disclosure:   I,Ida Denotn, am serving as a scribe; to document services personally performed by Kieran Varner MD based on data collection and the provider's statements to me.     Kieran Varner MD  Department of Urology [facility]    Attestation:  This patient was seen and evaluated by me, with a scribe taking notes.  I have reviewed the note above and agree.  The physical exam and or any procedures were performed by me and the pertinant details are outlined below.     Patient is a 68 year old  male   Vitals: Blood pressure 129/76, pulse 55, weight 60 kg (132 lb 4.8 oz).  General Appearance Adult: Alert, no acute distress, oriented    Assessment:  69 yo M w/ hx of gross hematuria    - Cytoscopy today was normal. Prior CT Urogram and urine cytology were negative.  - If hematuria continues, repeat workup in 2-3 years   - Discussed trial of Finasteride given this patient's enlarged prostate and current voiding symptoms. We discussed the associated risks, benefits, and side effects.  Patient was interested in a trial of Finasteride - will prescribe this.   - Follow up in 6 months     Kieran Varner MD  Department of Urology  [facility]      Note, dictation software may have been used for this note and the content was not proofread for accuracy    CC  Patient Care Team:  Daryl Watts MD as PCP - General (Family Practice)  Gabriela Seals PA-C as Physician Assistant (Physician Assistant)  Carmelina Loyd, RN as Registered Nurse  Usama Chang, RN as Clinic Care Coordinator  GABRIELA SEALS  GREGG    Copy to patient  WAYLON MCCLAIN JOSÉ MIGUEL  [address]  [facility] [address]

## 2018-06-14 NOTE — NURSING NOTE
The following medication was given:     MEDICATION:  Lidocaine 2% jelly  ROUTE: topical  SITE: urethra via the meatus  DOSE: 10 mL  LOT #: [id number]  : [employer]  EXPIRATION DATE:   NDC#: [address]-4   Was there drug waste? No      Ambika Marie CMA  2018      Invasive Procedure Safety Checklist:    Procedure:     Action: Complete sections and checkboxes as appropriate.    Pre-procedure:  1. Patient ID Verified with 2 identifiers (Berta and  or MRN) : YES    2. Procedure and site verified with patient/designee (when able) : YES    3. Accurate consent documentation in medical record : YES    4. H&P (or appropriate assessment) documented in medical record : YES  H&P must be up to 30 days prior to procedure an updated within 24 hours of                 Procedure as applicable.     5. Relevant diagnostic and radiology test results appropriately labeled and displayed as applicable : YES    6. Blood products, implants, devices, and/or special equipment available for the procedure as applicable : YES    7. Procedure site(s) marked with provider initials [Exclusions: None] : NO    8. Marking not required. Reason : Yes  Procedure does not require site marking    Time Out:     Time-Out performed immediately prior to starting procedure, including verbal and active participation of all team members addressing: YES    1. Correct patient identity.  2. Confirmed that the correct side and site are marked.  3. An accurate procedure to be done.  4. Agreement on the procedure to be done.  5. Correct patient position.  6. Relevant images and results are properly labeled and appropriately displayed.  7. The need to administer antibiotics or fluids for irrigation purposes during the procedure as applicable.  8. Safety precautions based on patient history or medication use.    During Procedure: Verification of correct person, site, and procedure occurs any time the  responsibility for care of the patient is transferred to another member of the care team.

## 2018-06-14 NOTE — NURSING NOTE
Chief Complaint   Patient presents with     Cystoscopy     Patient is here for a cysto     Xee Cr, SMA

## 2018-06-14 NOTE — LETTER
6/14/2018       RE: Buck Delacruz  [address]  [facility] [address]     Dear Colleague,    Thank you for referring your patient, Buck Delacruz, to the [facility] AND INST FOR PROSTATE AND UROLOGIC CANCERS at [facility]. Please see a copy of my visit note below.          Chief Complaint:   Gross Hematuria         Consult or Referral:     Mr. Buck Delacruz is a 68 year old male seen in consultation from Gabriela Seals PA-C         History of Present Illness:    Buck Delacruz is a very pleasant 68 year old male who presents with a history of gross  hematuria.      He saw Gabriela Seals PA-C approximately 1 month ago. At that time, patient had intermittent hematuria for the past 3-4 months accompanied by bilateral flank and side pain. UA in 2/2018 confirmed hematuria with 25-50 RBC in the absence of any infectious indicators.     CT Urogram 3/18/18 was negative.   Urine cytology from 5/14/2018 was negative.     Distant history of back fracture per patient    He reports difficulty voiding/incomplete emptying          Past Medical History:     Past Medical History:   Diagnosis Date     Back injury 1969     Bilateral upper abdominal pain 2/14/2018     Wrist injury 1969            Past Surgical History:     Past Surgical History:   Procedure Laterality Date     NO HISTORY OF SURGERY              Medications     Current Outpatient Prescriptions   Medication     finasteride (PROSCAR) 5 MG tablet     acetaminophen (TYLENOL) 325 MG tablet     cyclobenzaprine (FLEXERIL) 10 MG tablet     No current facility-administered medications for this visit.             Family History:     Family History   Problem Relation Age of Onset     Diabetes No family hx of      Coronary Artery Disease No family hx of      Hypertension No family hx of      Hyperlipidemia No family hx of      Cerebrovascular Disease No family hx of             Social History:     Social History     Social  History     Marital status: Single     Spouse name: N/A     Number of children: 9     Years of education: N/A     Occupational History     Not on file.     Social History Main Topics     Smoking status: Never Smoker     Smokeless tobacco: Never Used     Alcohol use No     Drug use: No     Sexual activity: Not Currently     Other Topics Concern     Not on file     Social History Narrative    [place]            Allergies:   Review of patient's allergies indicates no known allergies.         Review of Systems:  From intake questionnaire     Negative 14 system review except as noted on HPI, nurse's note.         Physical Exam:     Patient is a 68 year old  male   Vitals: Blood pressure 129/76, pulse 55, weight 60 kg (132 lb 4.8 oz).  General Appearance Adult: Alert, no acute distress, oriented  Lungs: no respiratory distress, or pursed lip breathing  Heart: No obvious jugular venous distension present  Abdomen: soft, nontender, no organomegaly or masses, Body mass index is 20.72 kg/(m^2).,   Musculoskeltal: extremities normal, no peripheral edema  Skin: no visible suspicious lesions or rashes  Neuro: Alert, oriented, speech and mentation normal  Psych: affect and mood normal  : deferred to cystoscopy    CYSTOSCOPY PROCEDURE:  After sterile preparation and draping of the patient,  a 16-Bhutanese flexible cystoscope was introduced via the urethra.  It was passed without difficulty into the bladder.  The urethra was open without evidence of stricture.  The ureteral orifices were orthotopic.  The bladder mucosa was evaluated using both antegrade and retroflex views and this showed no evidence of any lesions or trabeculation.  There were no stones. The prostate was moderately enlarged.       Labs and Pathology:    I reviewed all applicable laboratory and pathology data and went over findings with patient  Significant for   Lab Results   Component Value Date    CR 0.7 02/14/2018       Results for  orders placed or performed in visit on 05/14/18   Routine UA with microscopic - No culture   Result Value Ref Range    Color Urine Yellow     Appearance Urine Clear     Glucose Urine Negative NEG^Negative mg/dL    Bilirubin Urine Negative NEG^Negative    Ketones Urine Negative NEG^Negative mg/dL    Specific Gravity Urine 1.018 1.003 - 1.035    Blood Urine Large (A) NEG^Negative    pH Urine 5.0 5.0 - 7.0 pH    Protein Albumin Urine Negative NEG^Negative mg/dL    Urobilinogen mg/dL 0.0 0.0 - 2.0 mg/dL    Nitrite Urine Negative NEG^Negative    Leukocyte Esterase Urine Negative NEG^Negative    Source Midstream Urine     WBC Urine 1 0 - 5 /HPF    RBC Urine 30 (H) 0 - 2 /HPF    Mucous Urine Present (A) NEG^Negative /LPF     No results found for this or any previous visit.        Imaging:    I reviewed all applicable imaging and went over findings with patient.    Significant for CT Urogram 3/18/18:    FINDINGS:  Visualized lung bases are clear. No nephrolithiasis. No cholelithiasis. Liver, gallbladder, biliary ducts, spleen, adrenals and pancreas appear normal. Kidneys enhance and excrete normally. No dilatation of the collecting system or ureters. Complete opacification of normal appearing ureters with right ureteral jet noted. No significant finding in the urinary bladder. Prostate size is normal. No inflammation or dilatation of large or small bowel appreciated. Normal appendix. No air or fluid in the peritoneum. Abdominal wall is intact. Aorta is non aneurysmal. No pathologic adenopathy. Moderate osteoarthritis with vacuum disk at L5-S1. Mild diffuse facet arthrosis lumbar spine.  Mild degenerative arthritis sacroiliac joints.  Low-grade osteoarthritis hip joint space narrowing.     IMPRESSION:  1. No source for hematuria.  2. Mild osteoarthritis sacroiliac joints. Mild degenerative facet arthrosis lumbar spine and L5-S1 disc disease. Low-grade osteoarthritis both hips.      Outside and Past Medical records:    I  spent 10 minutes reviewing outside and past medical records.       Standardized Questionnaire:      Not completed today.         Assessment and Plan:     Assessment:  67 yo M w/ hx of gross hematuria    - Cytoscopy today was normal. Prior CT Urogram and urine cytology were negative.  - If hematuria continues, repeat workup in 2-3 years   - Discussed trial of Finasteride given this patient's enlarged prostate and current voiding symptoms. We discussed the associated risks, benefits, and side effects.  Patient was interested in a trial of Finasteride - will prescribe this.   - Follow up in 6 months     Orders  Orders Placed This Encounter   Procedures     FISH BLADDER CANCER     Scribe Disclosure:   IIda, am serving as a scribe; to document services personally performed by Kieran Varner MD based on data collection and the provider's statements to me.     Kieran Varner MD  Department of Urology [facility]    Attestation:  This patient was seen and evaluated by me, with a scribe taking notes.  I have reviewed the note above and agree.  The physical exam and or any procedures were performed by me and the pertinant details are outlined below.     Patient is a 68 year old  male   Vitals: Blood pressure 129/76, pulse 55, weight 60 kg (132 lb 4.8 oz).  General Appearance Adult: Alert, no acute distress, oriented    Assessment:  67 yo M w/ hx of gross hematuria    - Cytoscopy today was normal. Prior CT Urogram and urine cytology were negative.  - If hematuria continues, repeat workup in 2-3 years   - Discussed trial of Finasteride given this patient's enlarged prostate and current voiding symptoms. We discussed the associated risks, benefits, and side effects.  Patient was interested in a trial of Finasteride - will prescribe this.   - Follow up in 6 months     Kieran Varner MD  Department of Urology  [facility]      Note, dictation software may have been  used for this note and the content was not proofread for accuracy    CC  Patient Care Team:  Daryl Watts MD as PCP - General (Family Practice)  Gabriela Seals PA-C as Physician Assistant (Physician Assistant)  Carmelina Loyd, RN as Registered Nurse  Usama Chang, RN as Clinic Care Coordinator      Copy to patient  WAYLON MCCLAIN JOSÉ MIGUEL  [address]  [facility] [id number]

## 2018-06-21 LAB — COPATH REPORT: NORMAL

## 2018-07-20 ENCOUNTER — OFFICE VISIT (OUTPATIENT)
Dept: INTERPRETER SERVICES | Facility: CLINIC | Age: 68
End: 2018-07-20
Payer: COMMERCIAL

## 2018-07-20 ENCOUNTER — HOSPITAL ENCOUNTER (EMERGENCY)
Facility: CLINIC | Age: 68
Discharge: HOME | End: 2018-07-20
Attending: EMERGENCY MEDICINE | Admitting: EMERGENCY MEDICINE
Payer: COMMERCIAL

## 2018-07-20 ENCOUNTER — APPOINTMENT (OUTPATIENT)
Dept: CT IMAGING | Facility: CLINIC | Age: 68
End: 2018-07-20
Attending: EMERGENCY MEDICINE
Payer: COMMERCIAL

## 2018-07-20 ENCOUNTER — APPOINTMENT (OUTPATIENT)
Dept: GENERAL RADIOLOGY | Facility: CLINIC | Age: 68
End: 2018-07-20
Attending: EMERGENCY MEDICINE
Payer: COMMERCIAL

## 2018-07-20 VITALS
TEMPERATURE: 97.9 F | RESPIRATION RATE: 16 BRPM | DIASTOLIC BLOOD PRESSURE: 78 MMHG | HEART RATE: 62 BPM | BODY MASS INDEX: 20.36 KG/M2 | SYSTOLIC BLOOD PRESSURE: 138 MMHG | OXYGEN SATURATION: 100 % | WEIGHT: 130 LBS

## 2018-07-20 DIAGNOSIS — R10.9 FLANK PAIN: ICD-10-CM

## 2018-07-20 LAB
ALBUMIN SERPL-MCNC: 3.5 G/DL (ref 3.4–5)
ALBUMIN UR-MCNC: 10 MG/DL
ALP SERPL-CCNC: 57 U/L (ref 40–150)
ALT SERPL W P-5'-P-CCNC: 25 U/L (ref 0–70)
ANION GAP SERPL CALCULATED.3IONS-SCNC: 7 MMOL/L (ref 3–14)
APPEARANCE UR: CLEAR
AST SERPL W P-5'-P-CCNC: 26 U/L (ref 0–45)
BASOPHILS # BLD AUTO: 0 10E9/L (ref 0–0.2)
BASOPHILS NFR BLD AUTO: 0.7 %
BILIRUB SERPL-MCNC: 0.5 MG/DL (ref 0.2–1.3)
BILIRUB UR QL STRIP: NEGATIVE
BUN SERPL-MCNC: 12 MG/DL (ref 7–30)
CALCIUM SERPL-MCNC: 8.5 MG/DL (ref 8.5–10.1)
CHLORIDE SERPL-SCNC: 108 MMOL/L (ref 94–109)
CO2 SERPL-SCNC: 27 MMOL/L (ref 20–32)
COLOR UR AUTO: YELLOW
CREAT SERPL-MCNC: 0.73 MG/DL (ref 0.66–1.25)
DIFFERENTIAL METHOD BLD: NORMAL
EOSINOPHIL # BLD AUTO: 0.2 10E9/L (ref 0–0.7)
EOSINOPHIL NFR BLD AUTO: 2.4 %
ERYTHROCYTE [DISTWIDTH] IN BLOOD BY AUTOMATED COUNT: 13.3 % (ref 10–15)
GFR SERPL CREATININE-BSD FRML MDRD: >90 ML/MIN/1.7M2
GLUCOSE SERPL-MCNC: 86 MG/DL (ref 70–99)
GLUCOSE UR STRIP-MCNC: NEGATIVE MG/DL
HCT VFR BLD AUTO: 41.4 % (ref 40–53)
HGB BLD-MCNC: 13.9 G/DL (ref 13.3–17.7)
HGB UR QL STRIP: ABNORMAL
IMM GRANULOCYTES # BLD: 0 10E9/L (ref 0–0.4)
IMM GRANULOCYTES NFR BLD: 0.3 %
KETONES UR STRIP-MCNC: NEGATIVE MG/DL
LEUKOCYTE ESTERASE UR QL STRIP: NEGATIVE
LIPASE SERPL-CCNC: 81 U/L (ref 73–393)
LYMPHOCYTES # BLD AUTO: 1.8 10E9/L (ref 0.8–5.3)
LYMPHOCYTES NFR BLD AUTO: 29.8 %
MCH RBC QN AUTO: 30 PG (ref 26.5–33)
MCHC RBC AUTO-ENTMCNC: 33.6 G/DL (ref 31.5–36.5)
MCV RBC AUTO: 89 FL (ref 78–100)
MONOCYTES # BLD AUTO: 0.5 10E9/L (ref 0–1.3)
MONOCYTES NFR BLD AUTO: 7.5 %
MUCOUS THREADS #/AREA URNS LPF: PRESENT /LPF
NEUTROPHILS # BLD AUTO: 3.6 10E9/L (ref 1.6–8.3)
NEUTROPHILS NFR BLD AUTO: 59.3 %
NITRATE UR QL: NEGATIVE
NRBC # BLD AUTO: 0 10*3/UL
NRBC BLD AUTO-RTO: 0 /100
PH UR STRIP: 6 PH (ref 5–7)
PLATELET # BLD AUTO: 223 10E9/L (ref 150–450)
POTASSIUM SERPL-SCNC: 4.2 MMOL/L (ref 3.4–5.3)
PROT SERPL-MCNC: 7.9 G/DL (ref 6.8–8.8)
RBC # BLD AUTO: 4.63 10E12/L (ref 4.4–5.9)
RBC #/AREA URNS AUTO: 11 /HPF (ref 0–2)
SODIUM SERPL-SCNC: 142 MMOL/L (ref 133–144)
SOURCE: ABNORMAL
SP GR UR STRIP: 1.01 (ref 1–1.03)
SPERM #/AREA URNS HPF: PRESENT /HPF
TROPONIN I SERPL-MCNC: <0.015 UG/L (ref 0–0.04)
UROBILINOGEN UR STRIP-MCNC: NORMAL MG/DL (ref 0–2)
WBC # BLD AUTO: 6.1 10E9/L (ref 4–11)
WBC #/AREA URNS AUTO: 1 /HPF (ref 0–5)

## 2018-07-20 PROCEDURE — 80053 COMPREHEN METABOLIC PANEL: CPT | Performed by: EMERGENCY MEDICINE

## 2018-07-20 PROCEDURE — 85025 COMPLETE CBC W/AUTO DIFF WBC: CPT | Performed by: EMERGENCY MEDICINE

## 2018-07-20 PROCEDURE — T1013 SIGN LANG/ORAL INTERPRETER: HCPCS | Mod: U3

## 2018-07-20 PROCEDURE — 99284 EMERGENCY DEPT VISIT MOD MDM: CPT | Mod: Z6 | Performed by: EMERGENCY MEDICINE

## 2018-07-20 PROCEDURE — 25000132 ZZH RX MED GY IP 250 OP 250 PS 637: Performed by: EMERGENCY MEDICINE

## 2018-07-20 PROCEDURE — 74176 CT ABD & PELVIS W/O CONTRAST: CPT

## 2018-07-20 PROCEDURE — 99283 EMERGENCY DEPT VISIT LOW MDM: CPT | Performed by: EMERGENCY MEDICINE

## 2018-07-20 PROCEDURE — 83690 ASSAY OF LIPASE: CPT | Performed by: EMERGENCY MEDICINE

## 2018-07-20 PROCEDURE — 84484 ASSAY OF TROPONIN QUANT: CPT | Performed by: EMERGENCY MEDICINE

## 2018-07-20 PROCEDURE — 81001 URINALYSIS AUTO W/SCOPE: CPT | Performed by: EMERGENCY MEDICINE

## 2018-07-20 PROCEDURE — 71046 X-RAY EXAM CHEST 2 VIEWS: CPT

## 2018-07-20 RX ORDER — OXYCODONE AND ACETAMINOPHEN 5; 325 MG/1; MG/1
1 TABLET ORAL EVERY 6 HOURS PRN
Qty: 12 TABLET | Refills: 0 | Status: SHIPPED | OUTPATIENT
Start: 2018-07-20 | End: 2018-07-23

## 2018-07-20 RX ORDER — OXYCODONE AND ACETAMINOPHEN 5; 325 MG/1; MG/1
1 TABLET ORAL ONCE
Status: COMPLETED | OUTPATIENT
Start: 2018-07-20 | End: 2018-07-20

## 2018-07-20 RX ADMIN — OXYCODONE HYDROCHLORIDE AND ACETAMINOPHEN 1 TABLET: 5; 325 TABLET ORAL at 14:05

## 2018-07-20 ASSESSMENT — ENCOUNTER SYMPTOMS
ABDOMINAL PAIN: 1
COUGH: 1

## 2018-07-20 NOTE — ED PROVIDER NOTES
History     Chief Complaint   Patient presents with     Back Pain     right chest pain, radiating to the upper back pain, chronic, get worst last night,      The history is provided by the patient. The history is limited by a language barrier. A  was used (Montenegrin).     Buck Delacruz is a 68 year old male who presents to the Emergency Department due to abdominal pain. Patient reports that he has had abdominal pain in the right upper quadrant for quite some time but it recently worsened last night (7/19). He states that the abdominal pain radiates to the upper back and denies any recent trauma to the area. He also notes a slight cough. Patient has not taken any medication for pain management or seen a doctor for this in the past. He denies any known allergies. Patient had CT abdomen and pelvis urogram on 3/18/2018 that showed mild degenerative arthritis of SI joints.     No current facility-administered medications for this encounter.      Current Outpatient Prescriptions   Medication     acetaminophen (TYLENOL) 325 MG tablet     cyclobenzaprine (FLEXERIL) 10 MG tablet     finasteride (PROSCAR) 5 MG tablet     Past Medical History:   Diagnosis Date     Back injury 1969     Bilateral upper abdominal pain 2/14/2018     Wrist injury 1969       Past Surgical History:   Procedure Laterality Date     NO HISTORY OF SURGERY         Family History   Problem Relation Age of Onset     Diabetes No family hx of      Coronary Artery Disease No family hx of      Hypertension No family hx of      Hyperlipidemia No family hx of      Cerebrovascular Disease No family hx of        Social History   Substance Use Topics     Smoking status: Never Smoker     Smokeless tobacco: Never Used     Alcohol use No     No Known Allergies    I have reviewed the Medications, Allergies, Past Medical and Surgical History, and Social History in the Epic system.    Review of Systems   Respiratory: Positive for cough.     Gastrointestinal: Positive for abdominal pain (RUQ radiating to right back).   All other systems reviewed and are negative.      Physical Exam   BP: 142/78  Pulse: 57  Temp: 97.9  F (36.6  C)  Resp: 16  Weight: 59 kg (130 lb)  SpO2: 100 %      Physical Exam   Constitutional: He is oriented to person, place, and time. He appears well-developed and well-nourished. No distress.   HENT:   Head: Normocephalic and atraumatic.   Right Ear: External ear normal.   Left Ear: External ear normal.   Nose: Nose normal.   Mouth/Throat: Oropharynx is clear and moist.   Eyes: EOM are normal. Pupils are equal, round, and reactive to light. No scleral icterus.   Neck: Normal range of motion. Neck supple.   Cardiovascular: Normal rate, regular rhythm, normal heart sounds and intact distal pulses.    Pulmonary/Chest: Effort normal and breath sounds normal.   Abdominal: Soft. Bowel sounds are normal. He exhibits no distension and no mass. There is no tenderness. There is no rebound and no guarding.   Musculoskeletal: Normal range of motion.   Neurological: He is alert and oriented to person, place, and time.   Skin: Skin is warm and dry. He is not diaphoretic.   Psychiatric: He has a normal mood and affect.   Nursing note and vitals reviewed.      ED Course   11:43 AM  The patient was seen and examined by Maribel Mansfield MD in Room 2.   ED Course     Procedures           Labs Ordered and Resulted from Time of ED Arrival Up to the Time of Departure from the ED   URINE MACROSCOPIC WITH REFLEX TO MICRO - Abnormal; Notable for the following:        Result Value    Blood Urine Moderate (*)     Protein Albumin Urine 10 (*)     RBC Urine 11 (*)     Mucous Urine Present (*)     sperm Present (*)     All other components within normal limits   CBC WITH PLATELETS DIFFERENTIAL   COMPREHENSIVE METABOLIC PANEL   LIPASE   TROPONIN I            Assessments & Plan (with Medical Decision Making)   The patient presents for right upper abdominal pain that  radiates to the back.  He says he has had the pain but it is now worse. He appears well on exam. Vitals are normal.  His abdomen shows mild pain right sided. Diff dx includes uti, ureteral stone, gallstone, pancreatitis, pneumonia.  Labs were ordered and reviewed.  His wbc is normal. Lfts and lipase are also normal. Given his age, troponin was checked as well and is negative.  UA shows 11 red cells.  He complains of a slight cough so cxr was done and shows no acute opacity.  CT without contrast was done due to RBCS in urine and shows no stone. He was given 1 percocet for pain and felt better.  I have no cause for his pain.     I have reviewed the nursing notes.    I have reviewed the findings, diagnosis, plan and need for follow up with the patient.    Discharge Medication List as of 7/20/2018  3:16 PM      START taking these medications    Details   oxyCODONE-acetaminophen (PERCOCET) 5-325 MG per tablet Take 1 tablet by mouth every 6 hours as needed for moderate to severe pain, Disp-12 tablet, R-0, Local Print             Final diagnoses:   Flank pain   I, Yasmin Dior, am serving as a trained medical scribe to document services personally performed by Maribel Mansfield MD, based on the provider's statements to me.   IMaribel MD, was physically present and have reviewed and verified the accuracy of this note documented by Yasmin Dior.    7/20/2018   [facility], EMERGENCY DEPARTMENT     Maribel Mansfield MD  08/14/18 [id number]

## 2018-07-20 NOTE — ED AVS SNAPSHOT
[facility], [place], Emergency Department    [address]    [place] [address]    Phone:  [phone]    Fax:  [phone]                                       Buck Delacruz   MRN: [id number]    Department:  [facility], Emergency Department   Date of Visit:  7/20/2018           After Visit Summary Signature Page     I have received my discharge instructions, and my questions have been answered. I have discussed any challenges I see with this plan with the nurse or doctor.    ..........................................................................................................................................  Patient/Patient Representative Signature      ..........................................................................................................................................  Patient Representative Print Name and Relationship to Patient    ..................................................               ................................................  Date                                            Time    ..........................................................................................................................................  Reviewed by Signature/Title    ...................................................              ..............................................  Date                                                            Time

## 2018-07-20 NOTE — ED AVS SNAPSHOT
[facility], Emergency Department    [address]    [place] [address]    Phone:  [phone]    Fax:  [phone]                                       Buck Delacruz   MRN: [id number]    Department:  [facility], Emergency Department   Date of Visit:  7/20/2018           Patient Information     Date Of Birth          1950        Your diagnoses for this visit were:     Flank pain        You were seen by Maribel Mansfield MD.        Discharge Instructions       You can take percocet for pain if needed.     Please follow up with your primary care doctor early next week for recheck.     Seek medical attention if worsening/concerns.         Your next 10 appointments already scheduled     Dec 20, 2018  8:00 AM CST   (Arrive by 7:45 AM)   Return Visit with Kieran Varner MD   [facility] ([facility])    [address]  [address] [facility] [facility] [address] [phone]              24 Hour Appointment Hotline       To make an appointment at any [facility] clinic, call [phone] ([phone]). If you don't have a family doctor or clinic, we will help you find one. [facility] clinics are conveniently located to serve the needs of you and your family.             Review of your medicines      START taking        Dose / Directions Last dose taken    oxyCODONE-acetaminophen 5-325 MG per tablet   Commonly known as:  PERCOCET   Dose:  1 tablet   Quantity:  12 tablet        Take 1 tablet by mouth every 6 hours as needed for moderate to severe pain   Refills:  0          Our records show that you are taking the medicines listed below. If these are incorrect, please call your family doctor or clinic.        Dose / Directions Last dose taken    acetaminophen 325 MG tablet   Commonly known as:  TYLENOL   Dose:  650 mg   Quantity:  100 tablet        Take 2 tablets (650 mg) by mouth every 6 hours as needed for pain   Refills:   1        cyclobenzaprine 10 MG tablet   Commonly known as:  FLEXERIL   Dose:  5-10 mg   Quantity:  30 tablet        Take 0.5-1 tablets (5-10 mg) by mouth 3 times daily as needed for muscle spasms   Refills:  1        finasteride 5 MG tablet   Commonly known as:  PROSCAR   Dose:  5 mg   Quantity:  90 tablet        Take 1 tablet (5 mg) by mouth daily   Refills:  3                Information about OPIOIDS     PRESCRIPTION OPIOIDS: WHAT YOU NEED TO KNOW   We gave you an opioid (narcotic) pain medicine. It is important to manage your pain, but opioids are not always the best choice. You should first try all the other options your care team gave you. Take this medicine for as short a time (and as few doses) as possible.     These medicines have risks:    DO NOT drive when on new or higher doses of pain medicine. These medicines can affect your alertness and reaction times, and you could be arrested for driving under the influence (DUI). If you need to use opioids long-term, talk to your care team about driving.    DO NOT operate heave machinery    DO NOT do any other dangerous activities while taking these medicines.     DO NOT drink any alcohol while taking these medicines.      If the opioid prescribed includes acetaminophen, DO NOT take with any other medicines that contain acetaminophen. Read all labels carefully. Look for the word  acetaminophen  or  Tylenol.  Ask your pharmacist if you have questions or are unsure.    You can get addicted to pain medicines, especially if you have a history of addiction (chemical, alcohol or substance dependence). Talk to your care team about ways to reduce this risk.    Store your pills in a secure place, locked if possible. We will not replace any lost or stolen medicine. If you don t finish your medicine, please throw away (dispose) as directed by your pharmacist. The [place] [employer]ution [employer] has more information about safe disposal:  https://www.pca.[facility].[place].us/living-green/managing-unwanted-medications.     All opioids tend to cause constipation. Drink plenty of water and eat foods that have a lot of fiber, such as fruits, vegetables, prune juice, apple juice and high-fiber cereal. Take a laxative (Miralax, milk of magnesia, Colace, Senna) if you don t move your bowels at least every other day.         Prescriptions were sent or printed at these locations (1 Prescription)                   Other Prescriptions                Printed at Department/Unit printer (1 of 1)         oxyCODONE-acetaminophen (PERCOCET) 5-325 MG per tablet                Procedures and tests performed during your visit     CBC with platelets differential    CT Abdomen Pelvis w/o Contrast    Comprehensive metabolic panel    Lipase    Troponin I    UA reflex to Microscopic    XR Chest 2 Views      Orders Needing Specimen Collection     None      Pending Results     Date and Time Order Name Status Description    7/20/2018 1325 CT Abdomen Pelvis w/o Contrast Preliminary             Pending Culture Results     No orders found from 7/18/2018 to 7/21/2018.            Pending Results Instructions     If you had any lab results that were not finalized at the time of your Discharge, you can call the ED Lab Result RN at [phone]. You will be contacted by this team for any positive Lab results or changes in treatment. The nurses are available 7 days a week from 10A to 6:30P.  You can leave a message 24 hours per day and they will return your call.        Thank you for choosing [facility]       Thank you for choosing [facility] for your care. Our goal is always to provide you with excellent care. Hearing back from our patients is one way we can continue to improve our services. Please take a few minutes to complete the written survey that you may receive in the mail after you visit with us. Thank you!        [facility] Information     [employer] lets you send messages to your doctor, view  "your test results, renew your prescriptions, schedule appointments and more. To sign up, go to [email or web address] . Click on \"Log in\" on the left side of the screen, which will take you to the Welcome page. Then click on \"Sign up Now\" on the right side of the page.     You will be asked to enter the access code listed below, as well as some personal information. Please follow the directions to create your username and password.     Your access code is: [email or web address]  Expires: 2018  6:30 AM     Your access code will  in 90 days. If you need help or a new code, please call your [facility] clinic or [phone].        Care EveryWhere ID     This is your Care EveryWhere ID. This could be used by other organizations to access your [facility] medical records  [id number]        Equal Access to Services     SITA CASSIDY : Juliana Arshad, pamela pham, nicky sainialgayathri glaser, nhung handy . So [facility] [phone].    ATENCIÓN: Si habla español, tiene a wilcox disposición servicios gratuitos de asistencia lingüística. Llame al [phone].    We comply with applicable federal civil rights laws and [place] laws. We do not discriminate on the basis of race, color, national origin, age, disability, sex, sexual orientation, or gender identity.            After Visit Summary       This is your record. Keep this with you and show to your community pharmacist(s) and doctor(s) at your next visit.                  "

## 2018-10-29 NOTE — DISCHARGE INSTRUCTIONS
You can take percocet for pain if needed.     Please follow up with your primary care doctor early next week for recheck.     Seek medical attention if worsening/concerns.        No

## 2018-12-12 ENCOUNTER — PATIENT OUTREACH (OUTPATIENT)
Dept: CARE COORDINATION | Facility: CLINIC | Age: 68
End: 2018-12-12

## 2018-12-18 ENCOUNTER — PRE VISIT (OUTPATIENT)
Dept: UROLOGY | Facility: CLINIC | Age: 68
End: 2018-12-18

## 2019-02-11 ENCOUNTER — DOCUMENTATION ONLY (OUTPATIENT)
Dept: FAMILY MEDICINE | Facility: CLINIC | Age: 69
End: 2019-02-11

## 2019-02-22 NOTE — PROGRESS NOTES
Visit to the client's home for annual health risk assessment.  An  was present.    Current situation/living environment  Román lives alone in one bedroom apt.  At today visit he was fully dressed and well groomed.  Living spaces was clean and neat.    Activities of daily living (ADL)/instrumental activities of daily living (IADL) and functional issues  Client needs help with the following ADL's: walking  Client needs help with the following IADL's: shopping, cooking, housekeeping, laundry, managing finances/bills and transportation  Client states he is unable to perform the above due to back pains and poor endurance      Health concerns for today  Cllillie said he has now established care at [facility] and plans to continue going there.  Her reports being in the ER several times d/t back and R flank pains and fever.    Has patient fallen 2 or more times in the last year? Yes  Has patient fallen with injury in the last year? No    Cognition/mental health  Quiet but pleasant during our visit.    STARS/Med Adherence  Client is non-compliant with the following quality measures: Colon cancer screening  Comments: education efforts    Client's Plan of Care consists of:  Adult day care (3 days per week), hmk 5hr/week.  Supplies include cane, a/c unit

## 2022-06-19 ENCOUNTER — HOSPITAL ENCOUNTER (EMERGENCY)
Facility: CLINIC | Age: 72
Discharge: HOME | End: 2022-06-20
Attending: EMERGENCY MEDICINE | Admitting: EMERGENCY MEDICINE
Payer: COMMERCIAL

## 2022-06-19 DIAGNOSIS — H10.33 ACUTE CONJUNCTIVITIS OF BOTH EYES, UNSPECIFIED ACUTE CONJUNCTIVITIS TYPE: ICD-10-CM

## 2022-06-19 PROCEDURE — 250N000013 HC RX MED GY IP 250 OP 250 PS 637: Performed by: EMERGENCY MEDICINE

## 2022-06-19 PROCEDURE — 99284 EMERGENCY DEPT VISIT MOD MDM: CPT | Performed by: EMERGENCY MEDICINE

## 2022-06-19 PROCEDURE — 99283 EMERGENCY DEPT VISIT LOW MDM: CPT | Performed by: EMERGENCY MEDICINE

## 2022-06-19 PROCEDURE — 250N000009 HC RX 250: Performed by: EMERGENCY MEDICINE

## 2022-06-19 RX ORDER — ACETAMINOPHEN 325 MG/1
975 TABLET ORAL ONCE
Status: COMPLETED | OUTPATIENT
Start: 2022-06-19 | End: 2022-06-19

## 2022-06-19 RX ORDER — TOBRAMYCIN 3 MG/ML
1-2 SOLUTION/ DROPS OPHTHALMIC
Qty: 30 ML | Refills: 0 | Status: SHIPPED | OUTPATIENT
Start: 2022-06-19 | End: 2022-06-24

## 2022-06-19 RX ORDER — TOBRAMYCIN 3 MG/ML
1 SOLUTION/ DROPS OPHTHALMIC
Status: DISCONTINUED | OUTPATIENT
Start: 2022-06-20 | End: 2022-06-20 | Stop reason: HOSPADM

## 2022-06-19 RX ORDER — PROPARACAINE HYDROCHLORIDE 5 MG/ML
1 SOLUTION/ DROPS OPHTHALMIC ONCE
Status: COMPLETED | OUTPATIENT
Start: 2022-06-19 | End: 2022-06-19

## 2022-06-19 RX ADMIN — PROPARACAINE HYDROCHLORIDE 1 DROP: 5 SOLUTION/ DROPS OPHTHALMIC at 22:56

## 2022-06-19 RX ADMIN — ACETAMINOPHEN 325MG 975 MG: 325 TABLET ORAL at 23:02

## 2022-06-19 RX ADMIN — TOBRAMYCIN 1 DROP: 3 SOLUTION/ DROPS OPHTHALMIC at 23:57

## 2022-06-20 VITALS
WEIGHT: 140.3 LBS | SYSTOLIC BLOOD PRESSURE: 134 MMHG | RESPIRATION RATE: 18 BRPM | HEART RATE: 72 BPM | DIASTOLIC BLOOD PRESSURE: 83 MMHG | HEIGHT: 67 IN | BODY MASS INDEX: 22.02 KG/M2 | OXYGEN SATURATION: 97 % | TEMPERATURE: 98.5 F

## 2022-06-20 ASSESSMENT — ENCOUNTER SYMPTOMS
EYE DISCHARGE: 1
EYE PAIN: 1
HEADACHES: 1
WEAKNESS: 0
NUMBNESS: 0
FEVER: 0

## 2022-06-20 NOTE — ED PROVIDER NOTES
ED Provider Note  [facility] EMERGENCY DEPARTMENT ([facility])    6/19/22          History     Chief Complaint   Patient presents with     Eye Pain     Patient has bilateral eye pain that started yesterday, states he can't see far now.      HPI  Buck Delacruz is a 72 year old [place] male who presents to the ER complaining of bilateral eye issues.  Patient says his eye started hurting yesterday with bilateral eye tearing and burning sensation.  He says he did not get anything into his eye.  He says that he noticed that he was having a headache kind of behind his eyes.  Patient says that his vision has been mostly the same.  Patient says the watering of the eye changes his vision but otherwise his vision is similar to his normal.  Patient says he has had some nasal congestion.  No fevers.  Says his eyes have been stuck together in the morning.  The light irritates his eye.  Patient denies any prior eye surgeries.  Patient denies any weakness or numbness.  Denies any pain besides a mild headache.  Patient says he is able to ambulate without difficulty.  History was obtained via a  on the iPad.      This part of the medical record was transcribed by Giselle William Medical Scribe, from a dictation done by Lisbeth Parada MD.     Past Medical History  Past Medical History:   Diagnosis Date     Back injury 1969     Bilateral upper abdominal pain 2/14/2018     Wrist injury 1969     Past Surgical History:   Procedure Laterality Date     NO HISTORY OF SURGERY       tobramycin (TOBREX) 0.3 % ophthalmic solution  acetaminophen (TYLENOL) 325 MG tablet  cyclobenzaprine (FLEXERIL) 10 MG tablet  finasteride (PROSCAR) 5 MG tablet      No Known Allergies  Family History  Family History   Problem Relation Age of Onset     Diabetes No family hx of      Coronary Artery Disease No family hx of      Hypertension No family hx of      Hyperlipidemia No family hx of       "Cerebrovascular Disease No family hx of      Social History   Social History     Tobacco Use     Smoking status: Never Smoker     Smokeless tobacco: Never Used   Substance Use Topics     Alcohol use: No     Drug use: No      Past medical history, past surgical history, medications, allergies, family history, and social history were reviewed with the patient. No additional pertinent items.       Review of Systems   Constitutional: Negative for fever.   HENT: Positive for congestion.    Eyes: Positive for pain and discharge. Negative for visual disturbance.   Neurological: Positive for headaches. Negative for weakness and numbness.   All other systems reviewed and are negative.    A complete review of systems was performed with pertinent positives and negatives noted in the HPI, and all other systems negative.    Physical Exam   BP: (!) 147/79  Pulse: 67  Temp: 98.5  F (36.9  C)  Resp: 18  Height: 170.2 cm (5' 7\")  Weight: 63.6 kg (140 lb 4.8 oz)  SpO2: 98 %  Physical Exam  Constitutional:       Appearance: He is well-developed.   HENT:      Head: Normocephalic and atraumatic.   Eyes:      General: Lids are normal.         Right eye: Discharge present. No foreign body.         Left eye: Discharge present.     Extraocular Movements: Extraocular movements intact.      Conjunctiva/sclera:      Right eye: Right conjunctiva is injected. No chemosis, exudate or hemorrhage.     Left eye: Left conjunctiva is injected.      Pupils: Pupils are equal, round, and reactive to light.      Slit lamp exam:     Right eye: No corneal ulcer.      Left eye: No corneal ulcer.   Cardiovascular:      Rate and Rhythm: Normal rate and regular rhythm.      Heart sounds: Normal heart sounds.   Pulmonary:      Effort: Pulmonary effort is normal. No respiratory distress.      Breath sounds: No wheezing.   Abdominal:      General: There is no distension.      Palpations: Abdomen is soft.      Tenderness: There is no abdominal tenderness. There is " no rebound.   Musculoskeletal:      Cervical back: Normal range of motion and neck supple.   Skin:     General: Skin is warm.   Neurological:      Mental Status: He is alert and oriented to person, place, and time.   Psychiatric:         Behavior: Behavior normal.         Thought Content: Thought content normal.         ED Course      Procedures       The medical record was reviewed and interpreted.              No results found for any visits on 06/19/22.  Medications   tobramycin (TOBREX) 0.3 % ophthalmic solution 1 drop (1 drop Both Eyes Given 6/19/22 7537)   proparacaine (ALCAINE) 0.5 % ophthalmic solution 1 drop (1 drop Both Eyes Given 6/19/22 2256)   acetaminophen (TYLENOL) tablet 975 mg (975 mg Oral Given 6/19/22 2302)        Assessments & Plan (with Medical Decision Making)     Patient is a 72-year-old male who presents to the ER complaining of bilateral eye pain.  Patient's symptoms seem most consistent with conjunctivitis as he has been having watery drainage and that he did have some residual yellowish discharge in the medial canthus area of his right eye.  Patient received proparacaine and his eyes are feeling better.  Patient had no consensual photophobia and no signs of uveitis on exam.  Patient has no foreign bodies.  Patient symptoms seem most consistent with conjunctivitis.  His headache resolved with Tylenol.  Plan will be to discharge him home with a short course of Tobrex eyedrops.  The case was discussed with the ophthalmologist on-call.  They will be able to see him in clinic in 2 days on Wednesday morning.  This appointment was scheduled for him from the ER.  Patient does have low visual acuity but appears to be baseline for him.  Plan will be to have him evaluated in the ophthalmology clinic to make sure his visual acuity is not an acute concern.  Patient likely needs glasses.  Patient also might need cataract surgery since he has never had that done in the past.  Patient stable for  discharge    This part of the medical record was transcribed by Giselle William Medical Scribe, from a dictation done by Lisbeth Parada MD.     I have reviewed the nursing notes. I have reviewed the findings, diagnosis, plan and need for follow up with the patient.    Discharge Medication List as of 6/20/2022 12:06 AM      START taking these medications    Details   tobramycin (TOBREX) 0.3 % ophthalmic solution Place 1-2 drops into both eyes every 2 hours for 5 days, Disp-30 mL, R-0, Local Print             Final diagnoses:   Acute conjunctivitis of both eyes, unspecified acute conjunctivitis type     I, Giselle William, jeffrey serving as a trained medical scribe to document services personally performed by Lisbeth Parada MD based on the provider's statements to me on June 20, 2022.  This document has been checked and approved by the attending provider.    I,  Lisbeth Parada MD, was physically present and have reviewed and verified the accuracy of this note documented by Giselle William medical scribe.      --    [facility] EMERGENCY DEPARTMENT  6/19/2022     Lisbeth Parada MD  06/21/22 8559

## 2022-06-20 NOTE — DISCHARGE INSTRUCTIONS
You have an eye appointment scheduled for Wednesday, June 22, 2022 at 10:30 am at the [facility], [address] [place] [address] on the 9th floor in the eye clinic.     Please go to the appointment to have your eyes checked.     Please take the eye drops every 2 hours while awake.     Take tylenol as needed for pain.     Return to the ER if any other problems/concerns.

## 2022-06-20 NOTE — ED TRIAGE NOTES
Patient state with use of  that he is here for eye pain, both eyes. He states it started last night and today more pain. He states that he cannot see far, and this is new. ( had a lengthy conversation, but did not tell this RN what was said.)

## 2022-06-20 NOTE — ED TRIAGE NOTES
Triage Assessment     Row Name 06/19/22 2107       Triage Assessment (Adult)    Airway WDL WDL       Respiratory WDL    Respiratory WDL WDL       Skin Circulation/Temperature WDL    Skin Circulation/Temperature WDL WDL       Cardiac WDL    Cardiac WDL WDL       Peripheral/Neurovascular WDL    Peripheral Neurovascular WDL WDL       Cognitive/Neuro/Behavioral WDL    Cognitive/Neuro/Behavioral WDL WDL

## 2022-06-22 ENCOUNTER — OFFICE VISIT (OUTPATIENT)
Dept: OPHTHALMOLOGY | Facility: CLINIC | Age: 72
End: 2022-06-22
Attending: OPHTHALMOLOGY
Payer: COMMERCIAL

## 2022-06-22 DIAGNOSIS — H04.123 DRY EYE SYNDROME OF BOTH EYES: Primary | ICD-10-CM

## 2022-06-22 DIAGNOSIS — H25.813 COMBINED FORMS OF AGE-RELATED CATARACT OF BOTH EYES: ICD-10-CM

## 2022-06-22 DIAGNOSIS — H57.13 PAIN AROUND BOTH EYES: ICD-10-CM

## 2022-06-22 PROCEDURE — G0463 HOSPITAL OUTPT CLINIC VISIT: HCPCS

## 2022-06-22 PROCEDURE — 99203 OFFICE O/P NEW LOW 30 MIN: CPT | Mod: GC | Performed by: STUDENT IN AN ORGANIZED HEALTH CARE EDUCATION/TRAINING PROGRAM

## 2022-06-22 ASSESSMENT — VISUAL ACUITY
OS_PH_SC: 20/250
OD_PH_SC: 20/125
METHOD: SNELLEN - LINEAR
OD_SC: 20/400
OS_SC: 20/400

## 2022-06-22 ASSESSMENT — CONF VISUAL FIELD
OS_NORMAL: 1
OD_NORMAL: 1
METHOD: COUNTING FINGERS

## 2022-06-22 ASSESSMENT — TONOMETRY
OS_IOP_MMHG: 13
IOP_METHOD: TONOPEN
OD_IOP_MMHG: 11

## 2022-06-22 NOTE — PROGRESS NOTES
"HPI     Conjunctivitis Follow Up     In both eyes.  Associated symptoms include eye pain, burning and lid swelling.  Pain was noted as 7/10.  Occurring constantly.  Duration of 4 days.  Treatments tried include artificial tears.  Response to treatment was no improvement. Additional comments:                   Comments     Pt here today for conjunctivitis follow up from the ED.  Pt states has been managing eye pain and discomfort  for 4 days .  Pain is better temporary with drops use.   Eyes still very painful - like pt wants to \"pop eyeballs out\".    Ocular meds =   tobramycin 1-2 drops Q 2 hrs     Félix Hammond COA, SHAHIDA 11:17 AM 06/22/2022           Last edited by Félix Hammond on 6/22/2022 11:17 AM. (History)         Review of systems for the eyes was negative other than the pertinent positives/negatives listed in the HPI.      Assessment & Plan    HPI:  Bcuk Delacruz is a 72 year old male here for ED f/u of conjunctivitis and eye pain bilaterally.  He was given tobramycin which he is taking QID each eye with mild improvement which is only temporary.  Blurry vision in both eyes longstanding and wonders what he can do to improve vision.  Associated symptoms redness and watery discharge.  No other complaints.        POHx: None  PMHx: Hematuria    Current Medications: acetaminophen (TYLENOL) 325 MG tablet, Take 2 tablets (650 mg) by mouth every 6 hours as needed for pain  cyclobenzaprine (FLEXERIL) 10 MG tablet, Take 0.5-1 tablets (5-10 mg) by mouth 3 times daily as needed for muscle spasms  finasteride (PROSCAR) 5 MG tablet, Take 1 tablet (5 mg) by mouth daily  tobramycin (TOBREX) 0.3 % ophthalmic solution, Place 1-2 drops into both eyes every 2 hours for 5 days    No current facility-administered medications on file prior to visit.      Current Eye Medications: Tobramycin QID OU      Assessment & Plan:    (H04.123) Dry eye syndrome of both eyes  (primary encounter diagnosis)  (H57.13) Pain around both " eyes  Bilateral eye pain prompting ED visit, improving with tobramycin drops although no sign of conjuncitivits today.  Pain relieved with proparacaine.  Dryness present on exam.    - Artificial tears 4x/day and as needed each eye   - Warm compresses 2x/day each eye   - Stop tobramycin  - Recheck 2 months    (H25.813) Combined forms of age-related cataract of both eyes  Visually significant, discussed cataract surgery but patient prefers to try artificial tears  4+ brunescent lens  - Recheck CEE in 2 months with IOL calcs      Return in about 2 months (around 8/22/2022) for CEE + IOL calcs.      Kali Aquino MD   PGY4    Attending Physician Attestation:  Complete documentation of historical and exam elements from today's encounter can be found in the full encounter summary report (not reduplicated in this progress note).  I personally obtained the chief complaint(s) and history of present illness.  I confirmed and edited as necessary the review of systems, past medical/surgical history, family history, social history, and examination findings as documented by others; and I examined the patient myself.  I personally reviewed the relevant tests, images, and reports as documented above.  I formulated and edited as necessary the assessment and plan and discussed the findings and management plan with the patient and family. - Danie Tavera MD

## 2022-06-22 NOTE — PATIENT INSTRUCTIONS
Artificial tears: (Water-like consistency. Can be used during the daytime)  -Refresh Plus  -Refresh Optive  -Refresh Relieva  -Systane Ultra  -Systane Complete  -Systane Hydration  -Blink Tears  (Notes: Anything in a bottle has preservatives and can be used up to 4x/day. Preservative free vials can be used as much as necessary)     Gel drops: (Thicker consistency, may blur vision slightly. Can be used during the day or at night)  -Refresh Celluvisc  -Refresh Liquigel  -Refresh Optive Gel Drops  -Systane Gel Drops  -Blink Gel Drops     Ointments: (Very thick, these moisturize the best but can blur vision. Best used right before bedtime)  -Refresh PM  -Systane Nighttime  -Genteal Gel

## 2022-06-22 NOTE — NURSING NOTE
"Chief Complaints and History of Present Illnesses   Patient presents with     Conjunctivitis Follow Up              Chief Complaint(s) and History of Present Illness(es)     Conjunctivitis Follow Up     Laterality: both eyes    Associated symptoms: eye pain, burning and lid swelling    Pain scale: 7/10    Frequency: constantly    Duration: 4 days    Treatments tried: artificial tears    Response to treatment: no improvement    Comments:                   Comments     Pt here today for conjunctivitis follow up from the ED.  Pt states has been managing eye pain and discomfort  for 4 days .  Pain is better temporary with drops use.   Eyes still very painful - like pt wants to \"pop eyeballs out\".    Ocular meds =   tobramycin 1-2 drops Q 2 hrs     Félix RIGGINS, SHAHIDA 11:17 AM 06/22/2022                 "

## 2022-08-24 ENCOUNTER — OFFICE VISIT (OUTPATIENT)
Dept: OPHTHALMOLOGY | Facility: CLINIC | Age: 72
End: 2022-08-24
Attending: OPHTHALMOLOGY
Payer: COMMERCIAL

## 2022-08-24 DIAGNOSIS — H25.813 COMBINED FORMS OF AGE-RELATED CATARACT OF BOTH EYES: Primary | ICD-10-CM

## 2022-08-24 PROCEDURE — 99214 OFFICE O/P EST MOD 30 MIN: CPT | Performed by: OPHTHALMOLOGY

## 2022-08-24 PROCEDURE — G0463 HOSPITAL OUTPT CLINIC VISIT: HCPCS | Mod: 25

## 2022-08-24 PROCEDURE — 92015 DETERMINE REFRACTIVE STATE: CPT

## 2022-08-24 PROCEDURE — 76519 ECHO EXAM OF EYE: CPT | Performed by: OPHTHALMOLOGY

## 2022-08-24 ASSESSMENT — REFRACTION_MANIFEST
OD_CYLINDER: +1.25
OS_CYLINDER: +1.75
OD_AXIS: 178
OS_ADD: +2.75
OS_SPHERE: -5.00
OD_ADD: +2.75
METHOD_AUTOREFRACTION: 1
OS_AXIS: 169
OD_SPHERE: -4.75

## 2022-08-24 ASSESSMENT — CUP TO DISC RATIO
OD_RATIO: 0.7
OS_RATIO: 0.7

## 2022-08-24 ASSESSMENT — VISUAL ACUITY
OS_SC: 20/200
OS_PH_SC: 20/80
METHOD: SNELLEN - LINEAR
OD_PH_SC: 20/150
OD_SC: 20/300

## 2022-08-24 ASSESSMENT — CONF VISUAL FIELD
OS_NORMAL: 1
OD_NORMAL: 1
METHOD: COUNTING FINGERS

## 2022-08-24 ASSESSMENT — TONOMETRY
OD_IOP_MMHG: 20
OS_IOP_MMHG: 19
IOP_METHOD: TONOPEN

## 2022-08-24 NOTE — NURSING NOTE
Chief Complaints and History of Present Illnesses   Patient presents with     Cataract Evaluation     Chief Complaint(s) and History of Present Illness(es)     Cataract Evaluation               Comments     Pt here with  on the phone.  Pt states vision is the same as last visit, no worse.   Burning sensation in both eyes, pt not using artificial tears. Pt ran out of artificial tears 1 week ago.  No DM.    DELFINA Newton August 24, 2022 9:57 AM

## 2022-08-24 NOTE — PROGRESS NOTES
HPI     Pt here with  on the phone.  Pt states vision is the same as last visit, no worse.   Burning sensation in both eyes, pt not using artificial tears. Pt ran out of artificial tears 1 week ago.  No DM.    DELFINA Newton August 24, 2022 9:57 AM          Last edited by Germaine Angeles COMT on 8/24/2022 10:02 AM. (History)         Review of systems for the eyes was negative other than the pertinent positives/negatives listed in the HPI.      Assessment & Plan    HPI:  Buck Delacruz is a 72 year old male here for cataract evaluation, both eyes.    POHx: None  PMHx: Hematuria    Current Medications: acetaminophen (TYLENOL) 325 MG tablet, Take 2 tablets (650 mg) by mouth every 6 hours as needed for pain  cyclobenzaprine (FLEXERIL) 10 MG tablet, Take 0.5-1 tablets (5-10 mg) by mouth 3 times daily as needed for muscle spasms  finasteride (PROSCAR) 5 MG tablet, Take 1 tablet (5 mg) by mouth daily    No current facility-administered medications on file prior to visit.      Current Eye Medications:   None    Assessment & Plan:  (H25.813) Combined forms of age-related cataract of both eyes  Special equipment/needs:  Eye: right  Anesthesia:BLOCK-very poor at following directions  Dilates to: 5  Iris expansion:  Yes  Pseudoexfoliation: No  Trypan Blue: Yes  Trauma: No    Able lay to flat: Yes  Blood Thinner: No   Tamsulosin: No  DM: No  Guttae: No    Dominant Eye: right    Plan: [place] right eye, -1.00 OS  We discussed the benefits, alternatives, and risks to cataract surgery, including blindness, decreased vision, and need for additional surgeries; and informed consent was obtained.  Proceed with CE/IOL right eye followed by OS    Resident participation discussed  Here with neighbor, Akin Hawkins  Phone  used    (H04.123) Dry eye syndrome of both eyes   Restart artificial tears four times a day          Return for POD0.    Attending Physician Attestation:  Complete documentation of  historical and exam elements from today's encounter can be found in the full encounter summary report (not reduplicated in this progress note).  I personally obtained the chief complaint(s) and history of present illness.  I confirmed and edited as necessary the review of systems, past medical/surgical history, family history, social history, and examination findings as documented by others; and I examined the patient myself.  I personally reviewed the relevant tests, images, and reports as documented above.  I formulated and edited as necessary the assessment and plan and discussed the findings and management plan with the patient and family. - Danie Tavera MD

## 2022-08-26 ENCOUNTER — TELEPHONE (OUTPATIENT)
Dept: OPHTHALMOLOGY | Facility: CLINIC | Age: 72
End: 2022-08-26

## 2022-09-02 ENCOUNTER — TELEPHONE (OUTPATIENT)
Dept: OPHTHALMOLOGY | Facility: CLINIC | Age: 72
End: 2022-09-02

## 2022-09-02 PROBLEM — H25.813 COMBINED FORMS OF AGE-RELATED CATARACT OF BOTH EYES: Status: ACTIVE | Noted: 2022-09-02

## 2022-09-02 NOTE — TELEPHONE ENCOUNTER
Called patient to schedule surgery with Dr. Tavera    Date of Surgery: 1/5,1/19    Location of surgery: [facility]    Pre-Op H&P: PCP    Pre/Post Imaging:  No    Discussed COVID-19 Testing: Yes    Post-Op Appt Date: 1/11,1/19    Surgery Packet Mailed: yes      Additional comments: Spoke with patient via , they are aware of above dates, the need for preop and covid, they will review packet and call with any questions       Anna C. Schoenecker on 9/2/2022 at 10:48 AM

## 2022-09-29 ENCOUNTER — LAB REQUISITION (OUTPATIENT)
Dept: LAB | Facility: CLINIC | Age: 72
End: 2022-09-29
Payer: COMMERCIAL

## 2022-09-29 DIAGNOSIS — Z00.00 ENCOUNTER FOR GENERAL ADULT MEDICAL EXAMINATION WITHOUT ABNORMAL FINDINGS: ICD-10-CM

## 2022-09-29 LAB
ALBUMIN SERPL BCG-MCNC: 4 G/DL (ref 3.5–5.2)
ALP SERPL-CCNC: 66 U/L (ref 40–129)
ALT SERPL W P-5'-P-CCNC: 10 U/L (ref 10–50)
ANION GAP SERPL CALCULATED.3IONS-SCNC: 10 MMOL/L (ref 7–15)
AST SERPL W P-5'-P-CCNC: 19 U/L (ref 10–50)
BILIRUB SERPL-MCNC: 0.3 MG/DL
BUN SERPL-MCNC: 13.8 MG/DL (ref 8–23)
CALCIUM SERPL-MCNC: 8.6 MG/DL (ref 8.8–10.2)
CHLORIDE SERPL-SCNC: 103 MMOL/L (ref 98–107)
CHOLEST SERPL-MCNC: 196 MG/DL
CREAT SERPL-MCNC: 0.82 MG/DL (ref 0.67–1.17)
DEPRECATED HCO3 PLAS-SCNC: 22 MMOL/L (ref 22–29)
GFR SERPL CREATININE-BSD FRML MDRD: >90 ML/MIN/1.73M2
GLUCOSE SERPL-MCNC: 118 MG/DL (ref 70–99)
HDLC SERPL-MCNC: 38 MG/DL
LDLC SERPL CALC-MCNC: 111 MG/DL
NONHDLC SERPL-MCNC: 158 MG/DL
POTASSIUM SERPL-SCNC: 3.7 MMOL/L (ref 3.4–5.3)
PROT SERPL-MCNC: 7.2 G/DL (ref 6.4–8.3)
SODIUM SERPL-SCNC: 135 MMOL/L (ref 136–145)
TRIGL SERPL-MCNC: 233 MG/DL
TSH SERPL DL<=0.005 MIU/L-ACNC: 2.27 UIU/ML (ref 0.3–4.2)

## 2022-09-29 PROCEDURE — 84443 ASSAY THYROID STIM HORMONE: CPT | Mod: ORL | Performed by: FAMILY MEDICINE

## 2022-09-29 PROCEDURE — 80053 COMPREHEN METABOLIC PANEL: CPT | Mod: ORL | Performed by: FAMILY MEDICINE

## 2022-09-29 PROCEDURE — 80061 LIPID PANEL: CPT | Mod: ORL | Performed by: FAMILY MEDICINE

## 2023-01-02 ENCOUNTER — OFFICE VISIT (OUTPATIENT)
Dept: FAMILY MEDICINE | Facility: CLINIC | Age: 73
End: 2023-01-02
Payer: COMMERCIAL

## 2023-01-02 ENCOUNTER — LAB (OUTPATIENT)
Dept: LAB | Facility: CLINIC | Age: 73
End: 2023-01-02
Payer: COMMERCIAL

## 2023-01-02 VITALS
WEIGHT: 141.3 LBS | SYSTOLIC BLOOD PRESSURE: 141 MMHG | HEART RATE: 72 BPM | TEMPERATURE: 98 F | OXYGEN SATURATION: 98 % | BODY MASS INDEX: 22.13 KG/M2 | RESPIRATION RATE: 18 BRPM | DIASTOLIC BLOOD PRESSURE: 79 MMHG

## 2023-01-02 DIAGNOSIS — Z01.818 PRE-OP EXAM: ICD-10-CM

## 2023-01-02 DIAGNOSIS — Z01.818 PRE-OP EXAM: Primary | ICD-10-CM

## 2023-01-02 LAB
ANION GAP SERPL CALCULATED.3IONS-SCNC: 11 MMOL/L (ref 7–15)
BUN SERPL-MCNC: 14.2 MG/DL (ref 8–23)
CALCIUM SERPL-MCNC: 9.2 MG/DL (ref 8.8–10.2)
CHLORIDE SERPL-SCNC: 105 MMOL/L (ref 98–107)
CREAT SERPL-MCNC: 0.77 MG/DL (ref 0.67–1.17)
DEPRECATED HCO3 PLAS-SCNC: 22 MMOL/L (ref 22–29)
ERYTHROCYTE [DISTWIDTH] IN BLOOD BY AUTOMATED COUNT: 13.5 % (ref 10–15)
GFR SERPL CREATININE-BSD FRML MDRD: >90 ML/MIN/1.73M2
GLUCOSE SERPL-MCNC: 107 MG/DL (ref 70–99)
HCT VFR BLD AUTO: 41.1 % (ref 40–53)
HGB BLD-MCNC: 13.9 G/DL (ref 13.3–17.7)
MCH RBC QN AUTO: 30 PG (ref 26.5–33)
MCHC RBC AUTO-ENTMCNC: 33.8 G/DL (ref 31.5–36.5)
MCV RBC AUTO: 89 FL (ref 78–100)
PLATELET # BLD AUTO: 238 10E3/UL (ref 150–450)
POTASSIUM SERPL-SCNC: 4 MMOL/L (ref 3.4–5.3)
RBC # BLD AUTO: 4.63 10E6/UL (ref 4.4–5.9)
SODIUM SERPL-SCNC: 138 MMOL/L (ref 136–145)
WBC # BLD AUTO: 6.5 10E3/UL (ref 4–11)

## 2023-01-02 PROCEDURE — 85027 COMPLETE CBC AUTOMATED: CPT | Performed by: PATHOLOGY

## 2023-01-02 PROCEDURE — 80048 BASIC METABOLIC PNL TOTAL CA: CPT | Performed by: PATHOLOGY

## 2023-01-02 PROCEDURE — 99204 OFFICE O/P NEW MOD 45 MIN: CPT | Performed by: NURSE PRACTITIONER

## 2023-01-02 PROCEDURE — 36415 COLL VENOUS BLD VENIPUNCTURE: CPT | Performed by: PATHOLOGY

## 2023-01-02 ASSESSMENT — PAIN SCALES - GENERAL: PAINLEVEL: NO PAIN (0)

## 2023-01-02 NOTE — H&P (VIEW-ONLY)
[facility] [address]  [address] [facility] [address]  Phone: [phone]  Fax: [phone]  Primary Provider: Jayna [facility]  Pre-op Performing Provider:    FRANCA CARLSON  PHONE, : Annette- [phone]      PREOPERATIVE EVALUATION:  Today's date: 1/2/2023    Buck Delacruz is a 73 year old male who presents for a preoperative evaluation.    Surgical Information:  Surgery/Procedure: PHACOEMULSIFICATION, CATARACT, WITH INTRAOCULAR LENS IMPLANT LEFT  [place] [facility] [place]  Surgeon: Danie Tavera MD  Surgery Date: 1/19/2023  Time of Surgery: 8:05 AM  Where patient plans to recover: At home with family  Fax number for surgical facility: Note does not need to be faxed, will be available electronically in Epic.    Type of Anesthesia Anticipated: MAC with Block    Assessment & Plan     The proposed surgical procedure is considered LOW risk.    Pre-op exam  Will update labs as noted below.  - Basic metabolic panel  (Ca, Cl, CO2, Creat, Gluc, K, Na, BUN); Future  - CBC with platelets; Future        Risks and Recommendations:  The patient has the following additional risks and recommendations for perioperative complications:   - No identified additional risk factors other than previously addressed    Medication Instructions:  Patient is on no chronic medications    RECOMMENDATION:  APPROVAL GIVEN to proceed with proposed procedure pending review of diagnostic evaluation.        Subjective     HPI related to upcoming procedure:     73-year-old male presents for pre-op exam. Exam conducted with ipad . Per chart review, patient has PMH of bilateral cataracts.  Patient is scheduled for PHACOEMULSIFICATION, CATARACT, WITH INTRAOCULAR LENS IMPLANT (Right) with Dr. Tavera on 1/5/23 and PHACOEMULSIFICATION, CATARACT, WITH INTRAOCULAR LENS IMPLANT LEFT with Dr. Tavera on 1/19/23.  Today, patient denies acute concerns/symptoms  at time of exam.      Preop Questions 1/2/2023   1. Have you ever had a heart attack or stroke? No   2. Have you ever had surgery on your heart or blood vessels, such as a stent placement, a coronary artery bypass, or surgery on an artery in your head, neck, heart, or legs? No   3. Do you have chest pain with activity? No   4. Do you have a history of  heart failure? No   5. Do you currently have a cold, bronchitis or symptoms of other infection? No   6. Do you have a cough, shortness of breath, or wheezing? No   7. Do you or anyone in your family have previous history of blood clots? No   8. Do you or does anyone in your family have a serious bleeding problem such as prolonged bleeding following surgeries or cuts? No   9. Have you ever had problems with anemia or been told to take iron pills? No   10. Have you had any abnormal blood loss such as black, tarry or bloody stools? No   11. Have you ever had a blood transfusion? No   12. Are you willing to have a blood transfusion if it is medically needed before, during, or after your surgery? NO - Pt refusing.   13. Have you or any of your relatives ever had problems with anesthesia? UNKNOWN - Pt denies personal hx of anesthesia problems.   14. Do you have sleep apnea, excessive snoring or daytime drowsiness? No   15. Do you have any artifical heart valves or other implanted medical devices like a pacemaker, defibrillator, or continuous glucose monitor? No   16. Do you have artificial joints? No   17. Are you allergic to latex? No     Health Care Directive:  Patient does not have a Health Care Directive or Living Will: Discussed advance care planning with patient; however, patient declined at this time.    Preoperative Review of :   reviewed - Last script 08/2022    Status of Chronic Conditions:  See problem list for active medical problems.  Problems all longstanding and stable, except as noted/documented.  See ROS for pertinent symptoms related to these  conditions.      Review of Systems     CONSTITUTIONAL: NEGATIVE for fever, chills, change in weight  INTEGUMENTARY/SKIN: NEGATIVE for worrisome rashes, moles or lesions  EYES: As noted in HPI.  ENT/MOUTH: NEGATIVE for ear, mouth and throat problems  RESP: NEGATIVE for significant cough or SOB  CV: NEGATIVE for chest pain, palpitations or peripheral edema  GI: NEGATIVE for nausea, abdominal pain, heartburn, or change in bowel habits  : NEGATIVE for frequency, dysuria, or hematuria  MUSCULOSKELETAL: NEGATIVE for significant arthralgias or myalgia  NEURO: NEGATIVE for weakness, dizziness or paresthesias  ENDOCRINE: NEGATIVE for temperature intolerance, skin/hair changes  HEME: NEGATIVE for bleeding problems  PSYCHIATRIC: NEGATIVE for changes in mood or affect      Patient Active Problem List    Diagnosis Date Noted     Combined forms of age-related cataract of both eyes 09/02/2022     Priority: Medium     Added automatically from request for surgery [id number]       Other microscopic hematuria 02/14/2018     Priority: Medium     Bilateral upper abdominal pain 02/14/2018     Priority: Medium      Past Medical History:   Diagnosis Date     Back injury 1969     Bilateral upper abdominal pain 2/14/2018     Wrist injury 1969     Past Surgical History:   Procedure Laterality Date     NO HISTORY OF SURGERY       No current outpatient medications on file.     No current facility-administered medications for this visit.       No Known Allergies     Social History     Tobacco Use     Smoking status: Never     Smokeless tobacco: Never   Substance Use Topics     Alcohol use: No     Family History   Problem Relation Age of Onset     Diabetes No family hx of      Coronary Artery Disease No family hx of      Hypertension No family hx of      Hyperlipidemia No family hx of      Cerebrovascular Disease No family hx of      Macular Degeneration No family hx of      History   Drug Use No         Objective     BP (!) 141/79 (BP Location:  Right arm, Patient Position: Sitting, Cuff Size: Adult Regular)   Pulse 72   Temp 98  F (36.7  C) (Oral)   Resp 18   Wt 64.1 kg (141 lb 4.8 oz)   SpO2 98%   BMI 22.13 kg/m        Physical Exam    GENERAL APPEARANCE: healthy, alert and no distress     EYES: EOMI,  PERRL     HENT: ear canals and TM's normal and nose and mouth without ulcers or lesions     NECK: no adenopathy, no asymmetry, masses, or scars and thyroid normal to palpation     RESP: lungs clear to auscultation - no rales, rhonchi or wheezes     CV: regular rates and rhythm, normal S1 S2, no S3 or S4 and no murmur, click or rub     ABDOMEN:  soft, nontender, no HSM or masses and bowel sounds normal     MS: extremities normal- no gross deformities noted, no evidence of inflammation in joints, FROM in all extremities.     SKIN: no suspicious lesions or rashes     NEURO: Normal strength and tone, sensory exam grossly normal, mentation intact and speech normal     PSYCH: mentation appears normal. and affect normal/bright     LYMPHATICS: No cervical adenopathy    Recent Labs   Lab Test 09/29/22  1416   *   POTASSIUM 3.7   CR 0.82      9/29/22  Hemoglobin A1C 5.2%    WBC Count 4.0 - 11.0 10e9/L 5.3    RBC Count 3.80 - 5.20 10e12/L 4.48    Hemoglobin 13.3 - 17.7 g/dL 13.6    Hematocrit 40.0 - 53.0 % 40.4    MCV 78 - 100 fL 90    MCH 26.5 - 33.0 pg 30.4    MCHC 31.5 - 36.5 g/dL 33.7    Platelet Count 150 - 450 10e9/L 235    RDW 10.0 - 15.0 % 13.7    NEUT% 40.0 - 75.0 % 36.1 Low     LYMPH% 20.0 - 48.0 % 48.2 High     MONO% 0.0 - 12.0 % 10.7    EOS% 0.0 - 6.0 % 4.2    BASO% 0.0 - 2.0 % 0.8    NEUT# 1.6 - 8.3 10e9/L 1.9    LYMPH# 0.8 - 5.3 10e9/L 2.5    MONO# 0.0 - 1.3 10e9/L 0.6    EOS# 0.0 - 0.7 10e9/L 0.2    BASO# 0.0 - 0.2 10e9/L 0.0          Diagnostics:  Labs pending at this time.  Results will be reviewed when available.   No EKG required for low risk surgery (cataract, skin procedure, breast biopsy, etc).  No EKG required, no history of coronary  heart disease, significant arrhythmia, peripheral arterial disease or other structural heart disease.    Revised Cardiac Risk Index (RCRI):  The patient has the following serious cardiovascular risks for perioperative complications:   - No serious cardiac risks = 0 points     RCRI Interpretation: 0 points: Class I (very low risk - 0.4% complication rate)      All questions/concerns addressed. Patient stated understanding/agreement to plan of care.      Signed Electronically by: CHICO Liriano CNP  Copy of this evaluation report is provided to requesting physician.

## 2023-01-02 NOTE — H&P (VIEW-ONLY)
[facility] [address]  [address] [facility] [address]  Phone: [phone]  Fax: [phone]  Primary Provider: Jayna [facility]  Pre-op Performing Provider:    FRANCA CARLSON  PHONE, : Annette- [phone]      PREOPERATIVE EVALUATION:  Today's date: 1/2/2023    Buck Delacruz is a 73 year old male who presents for a preoperative evaluation.    Surgical Information:  Surgery/Procedure: PHACOEMULSIFICATION, CATARACT, WITH INTRAOCULAR LENS IMPLANT LEFT  Sur[place] Location: [facility] [place]  Surgeon: Danie Tavera MD  Surgery Date: 1/19/2023  Time of Surgery: 8:05 AM  Where patient plans to recover: At home with family  Fax number for surgical facility: Note does not need to be faxed, will be available electronically in Epic.    Type of Anesthesia Anticipated: MAC with Block    Assessment & Plan     The proposed surgical procedure is considered LOW risk.    Pre-op exam  Will update labs as noted below.  - Basic metabolic panel  (Ca, Cl, CO2, Creat, Gluc, K, Na, BUN); Future  - CBC with platelets; Future        Risks and Recommendations:  The patient has the following additional risks and recommendations for perioperative complications:   - No identified additional risk factors other than previously addressed    Medication Instructions:  Patient is on no chronic medications    RECOMMENDATION:  APPROVAL GIVEN to proceed with proposed procedure pending review of diagnostic evaluation.        Subjective     HPI related to upcoming procedure:     73-year-old male presents for pre-op exam. Exam conducted with ipad . Per chart review, patient has PMH of bilateral cataracts.  Patient is scheduled for PHACOEMULSIFICATION, CATARACT, WITH INTRAOCULAR LENS IMPLANT (Right) with Dr. Tavera on 1/5/23 and PHACOEMULSIFICATION, CATARACT, WITH INTRAOCULAR LENS IMPLANT LEFT with Dr. Tavera on 1/19/23.  Today, patient denies acute concerns/symptoms  at time of exam.      Preop Questions 1/2/2023   1. Have you ever had a heart attack or stroke? No   2. Have you ever had surgery on your heart or blood vessels, such as a stent placement, a coronary artery bypass, or surgery on an artery in your head, neck, heart, or legs? No   3. Do you have chest pain with activity? No   4. Do you have a history of  heart failure? No   5. Do you currently have a cold, bronchitis or symptoms of other infection? No   6. Do you have a cough, shortness of breath, or wheezing? No   7. Do you or anyone in your family have previous history of blood clots? No   8. Do you or does anyone in your family have a serious bleeding problem such as prolonged bleeding following surgeries or cuts? No   9. Have you ever had problems with anemia or been told to take iron pills? No   10. Have you had any abnormal blood loss such as black, tarry or bloody stools? No   11. Have you ever had a blood transfusion? No   12. Are you willing to have a blood transfusion if it is medically needed before, during, or after your surgery? NO - Pt refusing.   13. Have you or any of your relatives ever had problems with anesthesia? UNKNOWN - Pt denies personal hx of anesthesia problems.   14. Do you have sleep apnea, excessive snoring or daytime drowsiness? No   15. Do you have any artifical heart valves or other implanted medical devices like a pacemaker, defibrillator, or continuous glucose monitor? No   16. Do you have artificial joints? No   17. Are you allergic to latex? No     Health Care Directive:  Patient does not have a Health Care Directive or Living Will: Discussed advance care planning with patient; however, patient declined at this time.    Preoperative Review of :   reviewed - Last script 08/2022    Status of Chronic Conditions:  See problem list for active medical problems.  Problems all longstanding and stable, except as noted/documented.  See ROS for pertinent symptoms related to these  conditions.      Review of Systems     CONSTITUTIONAL: NEGATIVE for fever, chills, change in weight  INTEGUMENTARY/SKIN: NEGATIVE for worrisome rashes, moles or lesions  EYES: As noted in HPI.  ENT/MOUTH: NEGATIVE for ear, mouth and throat problems  RESP: NEGATIVE for significant cough or SOB  CV: NEGATIVE for chest pain, palpitations or peripheral edema  GI: NEGATIVE for nausea, abdominal pain, heartburn, or change in bowel habits  : NEGATIVE for frequency, dysuria, or hematuria  MUSCULOSKELETAL: NEGATIVE for significant arthralgias or myalgia  NEURO: NEGATIVE for weakness, dizziness or paresthesias  ENDOCRINE: NEGATIVE for temperature intolerance, skin/hair changes  HEME: NEGATIVE for bleeding problems  PSYCHIATRIC: NEGATIVE for changes in mood or affect      Patient Active Problem List    Diagnosis Date Noted     Combined forms of age-related cataract of both eyes 09/02/2022     Priority: Medium     Added automatically from request for surgery [id number]       Other microscopic hematuria 02/14/2018     Priority: Medium     Bilateral upper abdominal pain 02/14/2018     Priority: Medium      Past Medical History:   Diagnosis Date     Back injury 1969     Bilateral upper abdominal pain 2/14/2018     Wrist injury 1969     Past Surgical History:   Procedure Laterality Date     NO HISTORY OF SURGERY       No current outpatient medications on file.     No current facility-administered medications for this visit.       No Known Allergies     Social History     Tobacco Use     Smoking status: Never     Smokeless tobacco: Never   Substance Use Topics     Alcohol use: No     Family History   Problem Relation Age of Onset     Diabetes No family hx of      Coronary Artery Disease No family hx of      Hypertension No family hx of      Hyperlipidemia No family hx of      Cerebrovascular Disease No family hx of      Macular Degeneration No family hx of      History   Drug Use No         Objective     BP (!) 141/79 (BP Location:  Right arm, Patient Position: Sitting, Cuff Size: Adult Regular)   Pulse 72   Temp 98  F (36.7  C) (Oral)   Resp 18   Wt 64.1 kg (141 lb 4.8 oz)   SpO2 98%   BMI 22.13 kg/m        Physical Exam    GENERAL APPEARANCE: healthy, alert and no distress     EYES: EOMI,  PERRL     HENT: ear canals and TM's normal and nose and mouth without ulcers or lesions     NECK: no adenopathy, no asymmetry, masses, or scars and thyroid normal to palpation     RESP: lungs clear to auscultation - no rales, rhonchi or wheezes     CV: regular rates and rhythm, normal S1 S2, no S3 or S4 and no murmur, click or rub     ABDOMEN:  soft, nontender, no HSM or masses and bowel sounds normal     MS: extremities normal- no gross deformities noted, no evidence of inflammation in joints, FROM in all extremities.     SKIN: no suspicious lesions or rashes     NEURO: Normal strength and tone, sensory exam grossly normal, mentation intact and speech normal     PSYCH: mentation appears normal. and affect normal/bright     LYMPHATICS: No cervical adenopathy    Recent Labs   Lab Test 09/29/22  1416   *   POTASSIUM 3.7   CR 0.82      9/29/22  Hemoglobin A1C 5.2%    WBC Count 4.0 - 11.0 10e9/L 5.3    RBC Count 3.80 - 5.20 10e12/L 4.48    Hemoglobin 13.3 - 17.7 g/dL 13.6    Hematocrit 40.0 - 53.0 % 40.4    MCV 78 - 100 fL 90    MCH 26.5 - 33.0 pg 30.4    MCHC 31.5 - 36.5 g/dL 33.7    Platelet Count 150 - 450 10e9/L 235    RDW 10.0 - 15.0 % 13.7    NEUT% 40.0 - 75.0 % 36.1 Low     LYMPH% 20.0 - 48.0 % 48.2 High     MONO% 0.0 - 12.0 % 10.7    EOS% 0.0 - 6.0 % 4.2    BASO% 0.0 - 2.0 % 0.8    NEUT# 1.6 - 8.3 10e9/L 1.9    LYMPH# 0.8 - 5.3 10e9/L 2.5    MONO# 0.0 - 1.3 10e9/L 0.6    EOS# 0.0 - 0.7 10e9/L 0.2    BASO# 0.0 - 0.2 10e9/L 0.0          Diagnostics:  Labs pending at this time.  Results will be reviewed when available.   No EKG required for low risk surgery (cataract, skin procedure, breast biopsy, etc).  No EKG required, no history of coronary  heart disease, significant arrhythmia, peripheral arterial disease or other structural heart disease.    Revised Cardiac Risk Index (RCRI):  The patient has the following serious cardiovascular risks for perioperative complications:   - No serious cardiac risks = 0 points     RCRI Interpretation: 0 points: Class I (very low risk - 0.4% complication rate)      All questions/concerns addressed. Patient stated understanding/agreement to plan of care.      Signed Electronically by: CHICO Liriano CNP  Copy of this evaluation report is provided to requesting physician.

## 2023-01-02 NOTE — NURSING NOTE
Chief Complaint   Patient presents with     Pre-Op Exam     Blood pressure (!) 141/79, pulse 72, temperature 98  F (36.7  C), temperature source Oral, resp. rate 18, weight 64.1 kg (141 lb 4.8 oz), SpO2 98 %.    Gabbi Bruce, EMT

## 2023-01-02 NOTE — PROGRESS NOTES
[facility] [address]  [address] [facility] [address]  Phone: [phone]  Fax: [phone]  Primary Provider: Jayna [facility]  Pre-op Performing Provider:    FRANCA CARLSON  PHONE, : Annette- [phone]      PREOPERATIVE EVALUATION:  Today's date: 1/2/2023    Buck Delacruz is a 73 year old male who presents for a preoperative evaluation.    Surgical Information:  Surgery/Procedure: PHACOEMULSIFICATION, CATARACT, WITH INTRAOCULAR LENS IMPLANT LEFT  Surgery Location: [facility] OR  Surgeon: Danie Tavera MD  Surgery Date: 1/19/2023  Time of Surgery: 8:05 AM  Where patient plans to recover: At home with family  Fax number for surgical facility: Note does not need to be faxed, will be available electronically in Epic.    Type of Anesthesia Anticipated: MAC with Block    Assessment & Plan     The proposed surgical procedure is considered LOW risk.    Pre-op exam  Will update labs as noted below.  - Basic metabolic panel  (Ca, Cl, CO2, Creat, Gluc, K, Na, BUN); Future  - CBC with platelets; Future        Risks and Recommendations:  The patient has the following additional risks and recommendations for perioperative complications:   - No identified additional risk factors other than previously addressed    Medication Instructions:  Patient is on no chronic medications    RECOMMENDATION:  APPROVAL GIVEN to proceed with proposed procedure pending review of diagnostic evaluation.        Subjective     HPI related to upcoming procedure:     73-year-old male presents for pre-op exam. Exam conducted with ipad . Per chart review, patient has PMH of bilateral cataracts.  Patient is scheduled for PHACOEMULSIFICATION, CATARACT, WITH INTRAOCULAR LENS IMPLANT (Right) with Dr. Tavera on 1/5/23 and PHACOEMULSIFICATION, CATARACT, WITH INTRAOCULAR LENS IMPLANT LEFT with Dr. Tavera on 1/19/23.  Today, patient denies acute concerns/symptoms  at time of exam.      Preop Questions 1/2/2023   1. Have you ever had a heart attack or stroke? No   2. Have you ever had surgery on your heart or blood vessels, such as a stent placement, a coronary artery bypass, or surgery on an artery in your head, neck, heart, or legs? No   3. Do you have chest pain with activity? No   4. Do you have a history of  heart failure? No   5. Do you currently have a cold, bronchitis or symptoms of other infection? No   6. Do you have a cough, shortness of breath, or wheezing? No   7. Do you or anyone in your family have previous history of blood clots? No   8. Do you or does anyone in your family have a serious bleeding problem such as prolonged bleeding following surgeries or cuts? No   9. Have you ever had problems with anemia or been told to take iron pills? No   10. Have you had any abnormal blood loss such as black, tarry or bloody stools? No   11. Have you ever had a blood transfusion? No   12. Are you willing to have a blood transfusion if it is medically needed before, during, or after your surgery? NO - Pt refusing.   13. Have you or any of your relatives ever had problems with anesthesia? UNKNOWN - Pt denies personal hx of anesthesia problems.   14. Do you have sleep apnea, excessive snoring or daytime drowsiness? No   15. Do you have any artifical heart valves or other implanted medical devices like a pacemaker, defibrillator, or continuous glucose monitor? No   16. Do you have artificial joints? No   17. Are you allergic to latex? No     Health Care Directive:  Patient does not have a Health Care Directive or Living Will: Discussed advance care planning with patient; however, patient declined at this time.    Preoperative Review of :   reviewed - Last script 08/2022    Status of Chronic Conditions:  See problem list for active medical problems.  Problems all longstanding and stable, except as noted/documented.  See ROS for pertinent symptoms related to these  conditions.      Review of Systems     CONSTITUTIONAL: NEGATIVE for fever, chills, change in weight  INTEGUMENTARY/SKIN: NEGATIVE for worrisome rashes, moles or lesions  EYES: As noted in HPI.  ENT/MOUTH: NEGATIVE for ear, mouth and throat problems  RESP: NEGATIVE for significant cough or SOB  CV: NEGATIVE for chest pain, palpitations or peripheral edema  GI: NEGATIVE for nausea, abdominal pain, heartburn, or change in bowel habits  : NEGATIVE for frequency, dysuria, or hematuria  MUSCULOSKELETAL: NEGATIVE for significant arthralgias or myalgia  NEURO: NEGATIVE for weakness, dizziness or paresthesias  ENDOCRINE: NEGATIVE for temperature intolerance, skin/hair changes  HEME: NEGATIVE for bleeding problems  PSYCHIATRIC: NEGATIVE for changes in mood or affect      Patient Active Problem List    Diagnosis Date Noted     Combined forms of age-related cataract of both eyes 09/02/2022     Priority: Medium     Added automatically from request for surgery [id number]       Other microscopic hematuria 02/14/2018     Priority: Medium     Bilateral upper abdominal pain 02/14/2018     Priority: Medium      Past Medical History:   Diagnosis Date     Back injury 1969     Bilateral upper abdominal pain 2/14/2018     Wrist injury 1969     Past Surgical History:   Procedure Laterality Date     NO HISTORY OF SURGERY       No current outpatient medications on file.     No current facility-administered medications for this visit.       No Known Allergies     Social History     Tobacco Use     Smoking status: Never     Smokeless tobacco: Never   Substance Use Topics     Alcohol use: No     Family History   Problem Relation Age of Onset     Diabetes No family hx of      Coronary Artery Disease No family hx of      Hypertension No family hx of      Hyperlipidemia No family hx of      Cerebrovascular Disease No family hx of      Macular Degeneration No family hx of      History   Drug Use No         Objective     BP (!) 141/79 (BP Location:  Right arm, Patient Position: Sitting, Cuff Size: Adult Regular)   Pulse 72   Temp 98  F (36.7  C) (Oral)   Resp 18   Wt 64.1 kg (141 lb 4.8 oz)   SpO2 98%   BMI 22.13 kg/m        Physical Exam    GENERAL APPEARANCE: healthy, alert and no distress     EYES: EOMI,  PERRL     HENT: ear canals and TM's normal and nose and mouth without ulcers or lesions     NECK: no adenopathy, no asymmetry, masses, or scars and thyroid normal to palpation     RESP: lungs clear to auscultation - no rales, rhonchi or wheezes     CV: regular rates and rhythm, normal S1 S2, no S3 or S4 and no murmur, click or rub     ABDOMEN:  soft, nontender, no HSM or masses and bowel sounds normal     MS: extremities normal- no gross deformities noted, no evidence of inflammation in joints, FROM in all extremities.     SKIN: no suspicious lesions or rashes     NEURO: Normal strength and tone, sensory exam grossly normal, mentation intact and speech normal     PSYCH: mentation appears normal. and affect normal/bright     LYMPHATICS: No cervical adenopathy    Recent Labs   Lab Test 09/29/22  1416   *   POTASSIUM 3.7   CR 0.82      9/29/22  Hemoglobin A1C 5.2%    WBC Count 4.0 - 11.0 10e9/L 5.3    RBC Count 3.80 - 5.20 10e12/L 4.48    Hemoglobin 13.3 - 17.7 g/dL 13.6    Hematocrit 40.0 - 53.0 % 40.4    MCV 78 - 100 fL 90    MCH 26.5 - 33.0 pg 30.4    MCHC 31.5 - 36.5 g/dL 33.7    Platelet Count 150 - 450 10e9/L 235    RDW 10.0 - 15.0 % 13.7    NEUT% 40.0 - 75.0 % 36.1 Low     LYMPH% 20.0 - 48.0 % 48.2 High     MONO% 0.0 - 12.0 % 10.7    EOS% 0.0 - 6.0 % 4.2    BASO% 0.0 - 2.0 % 0.8    NEUT# 1.6 - 8.3 10e9/L 1.9    LYMPH# 0.8 - 5.3 10e9/L 2.5    MONO# 0.0 - 1.3 10e9/L 0.6    EOS# 0.0 - 0.7 10e9/L 0.2    BASO# 0.0 - 0.2 10e9/L 0.0          Diagnostics:  Labs pending at this time.  Results will be reviewed when available.   No EKG required for low risk surgery (cataract, skin procedure, breast biopsy, etc).  No EKG required, no history of coronary  heart disease, significant arrhythmia, peripheral arterial disease or other structural heart disease.    Revised Cardiac Risk Index (RCRI):  The patient has the following serious cardiovascular risks for perioperative complications:   - No serious cardiac risks = 0 points     RCRI Interpretation: 0 points: Class I (very low risk - 0.4% complication rate)      All questions/concerns addressed. Patient stated understanding/agreement to plan of care.      Signed Electronically by: CHICO Liriano CNP  Copy of this evaluation report is provided to requesting physician.

## 2023-01-03 NOTE — RESULT ENCOUNTER NOTE
Malvin Limon,    Can you please call this patient and let him know that he is approved for his procedure?    Thanks,  Tiago

## 2023-01-04 ENCOUNTER — ANESTHESIA EVENT (OUTPATIENT)
Dept: SURGERY | Facility: AMBULATORY SURGERY CENTER | Age: 73
End: 2023-01-04
Payer: COMMERCIAL

## 2023-01-04 RX ORDER — FENTANYL CITRATE 50 UG/ML
50 INJECTION, SOLUTION INTRAMUSCULAR; INTRAVENOUS EVERY 5 MIN PRN
Status: CANCELLED | OUTPATIENT
Start: 2023-01-04

## 2023-01-04 RX ORDER — HYDROMORPHONE HYDROCHLORIDE 1 MG/ML
0.4 INJECTION, SOLUTION INTRAMUSCULAR; INTRAVENOUS; SUBCUTANEOUS EVERY 5 MIN PRN
Status: CANCELLED | OUTPATIENT
Start: 2023-01-04

## 2023-01-04 RX ORDER — HYDROMORPHONE HYDROCHLORIDE 1 MG/ML
0.2 INJECTION, SOLUTION INTRAMUSCULAR; INTRAVENOUS; SUBCUTANEOUS EVERY 5 MIN PRN
Status: CANCELLED | OUTPATIENT
Start: 2023-01-04

## 2023-01-04 RX ORDER — FENTANYL CITRATE 50 UG/ML
25 INJECTION, SOLUTION INTRAMUSCULAR; INTRAVENOUS
Status: CANCELLED | OUTPATIENT
Start: 2023-01-04

## 2023-01-04 RX ORDER — FENTANYL CITRATE 50 UG/ML
25 INJECTION, SOLUTION INTRAMUSCULAR; INTRAVENOUS EVERY 5 MIN PRN
Status: CANCELLED | OUTPATIENT
Start: 2023-01-04

## 2023-01-04 RX ORDER — MEPERIDINE HYDROCHLORIDE 25 MG/ML
12.5 INJECTION INTRAMUSCULAR; INTRAVENOUS; SUBCUTANEOUS
Status: CANCELLED | OUTPATIENT
Start: 2023-01-04

## 2023-01-04 RX ORDER — OXYCODONE HYDROCHLORIDE 5 MG/1
5 TABLET ORAL EVERY 4 HOURS PRN
Status: CANCELLED | OUTPATIENT
Start: 2023-01-04

## 2023-01-04 RX ORDER — ONDANSETRON 4 MG/1
4 TABLET, ORALLY DISINTEGRATING ORAL EVERY 30 MIN PRN
Status: CANCELLED | OUTPATIENT
Start: 2023-01-04

## 2023-01-04 RX ORDER — OXYCODONE HYDROCHLORIDE 5 MG/1
10 TABLET ORAL EVERY 4 HOURS PRN
Status: CANCELLED | OUTPATIENT
Start: 2023-01-04

## 2023-01-04 RX ORDER — ONDANSETRON 2 MG/ML
4 INJECTION INTRAMUSCULAR; INTRAVENOUS EVERY 30 MIN PRN
Status: CANCELLED | OUTPATIENT
Start: 2023-01-04

## 2023-01-04 RX ORDER — SODIUM CHLORIDE, SODIUM LACTATE, POTASSIUM CHLORIDE, CALCIUM CHLORIDE 600; 310; 30; 20 MG/100ML; MG/100ML; MG/100ML; MG/100ML
INJECTION, SOLUTION INTRAVENOUS CONTINUOUS
Status: CANCELLED | OUTPATIENT
Start: 2023-01-04

## 2023-01-05 ENCOUNTER — OFFICE VISIT (OUTPATIENT)
Dept: OPHTHALMOLOGY | Facility: CLINIC | Age: 73
End: 2023-01-05

## 2023-01-05 ENCOUNTER — ANESTHESIA (OUTPATIENT)
Dept: SURGERY | Facility: AMBULATORY SURGERY CENTER | Age: 73
End: 2023-01-05
Payer: COMMERCIAL

## 2023-01-05 ENCOUNTER — HOSPITAL ENCOUNTER (OUTPATIENT)
Facility: AMBULATORY SURGERY CENTER | Age: 73
Discharge: HOME | End: 2023-01-05
Attending: OPHTHALMOLOGY
Payer: COMMERCIAL

## 2023-01-05 VITALS
HEART RATE: 66 BPM | RESPIRATION RATE: 17 BRPM | HEIGHT: 67 IN | TEMPERATURE: 97.2 F | SYSTOLIC BLOOD PRESSURE: 142 MMHG | OXYGEN SATURATION: 99 % | WEIGHT: 139 LBS | DIASTOLIC BLOOD PRESSURE: 74 MMHG | BODY MASS INDEX: 21.82 KG/M2

## 2023-01-05 DIAGNOSIS — H25.811 COMBINED FORM OF AGE-RELATED CATARACT, RIGHT EYE: Primary | ICD-10-CM

## 2023-01-05 DIAGNOSIS — Z96.1 PSEUDOPHAKIA, RIGHT EYE: Primary | ICD-10-CM

## 2023-01-05 DIAGNOSIS — H25.813 COMBINED FORMS OF AGE-RELATED CATARACT OF BOTH EYES: ICD-10-CM

## 2023-01-05 PROCEDURE — 66984 XCAPSL CTRC RMVL W/O ECP: CPT | Mod: RT

## 2023-01-05 PROCEDURE — 66984 XCAPSL CTRC RMVL W/O ECP: CPT | Mod: RT | Performed by: OPHTHALMOLOGY

## 2023-01-05 PROCEDURE — 99024 POSTOP FOLLOW-UP VISIT: CPT | Performed by: OPHTHALMOLOGY

## 2023-01-05 DEVICE — LENS CC60WF 21.5 CLAREON UV ASPHERIC BICONVEX IOL: Type: IMPLANTABLE DEVICE | Site: EYE | Status: FUNCTIONAL

## 2023-01-05 RX ORDER — ACETAMINOPHEN 325 MG/1
975 TABLET ORAL ONCE
Status: DISCONTINUED | OUTPATIENT
Start: 2023-01-05 | End: 2023-01-06 | Stop reason: HOSPADM

## 2023-01-05 RX ORDER — SODIUM CHLORIDE, SODIUM LACTATE, POTASSIUM CHLORIDE, CALCIUM CHLORIDE 600; 310; 30; 20 MG/100ML; MG/100ML; MG/100ML; MG/100ML
INJECTION, SOLUTION INTRAVENOUS CONTINUOUS
Status: DISCONTINUED | OUTPATIENT
Start: 2023-01-05 | End: 2023-01-06 | Stop reason: HOSPADM

## 2023-01-05 RX ORDER — PROPARACAINE HYDROCHLORIDE 5 MG/ML
1 SOLUTION/ DROPS OPHTHALMIC
Status: DISCONTINUED | OUTPATIENT
Start: 2023-01-05 | End: 2023-01-06 | Stop reason: HOSPADM

## 2023-01-05 RX ORDER — MOXIFLOXACIN 5 MG/ML
1 SOLUTION/ DROPS OPHTHALMIC 4 TIMES DAILY
Qty: 3 ML | Refills: 0 | Status: SHIPPED | OUTPATIENT
Start: 2023-01-05 | End: 2023-05-10

## 2023-01-05 RX ORDER — KETOROLAC TROMETHAMINE 5 MG/ML
1 SOLUTION OPHTHALMIC 4 TIMES DAILY
Qty: 10 ML | Refills: 0 | Status: SHIPPED | OUTPATIENT
Start: 2023-01-05 | End: 2023-05-10

## 2023-01-05 RX ORDER — PROPOFOL 10 MG/ML
INJECTION, EMULSION INTRAVENOUS PRN
Status: DISCONTINUED | OUTPATIENT
Start: 2023-01-05 | End: 2023-01-05

## 2023-01-05 RX ORDER — LIDOCAINE HYDROCHLORIDE 20 MG/ML
INJECTION, SOLUTION INFILTRATION; PERINEURAL PRN
Status: DISCONTINUED | OUTPATIENT
Start: 2023-01-05 | End: 2023-01-05

## 2023-01-05 RX ORDER — TETRACAINE HYDROCHLORIDE 5 MG/ML
SOLUTION OPHTHALMIC PRN
Status: DISCONTINUED | OUTPATIENT
Start: 2023-01-05 | End: 2023-01-05 | Stop reason: HOSPADM

## 2023-01-05 RX ORDER — MOXIFLOXACIN 5 MG/ML
1 SOLUTION/ DROPS OPHTHALMIC
Status: COMPLETED | OUTPATIENT
Start: 2023-01-05 | End: 2023-01-05

## 2023-01-05 RX ORDER — MOXIFLOXACIN IN NACL,ISO-OS/PF 0.3MG/0.3
SYRINGE (ML) INTRAOCULAR PRN
Status: DISCONTINUED | OUTPATIENT
Start: 2023-01-05 | End: 2023-01-05 | Stop reason: HOSPADM

## 2023-01-05 RX ORDER — CYCLOPENTOLAT/TROPIC/PHENYLEPH 1%-1%-2.5%
1 DROPS (EA) OPHTHALMIC (EYE)
Status: COMPLETED | OUTPATIENT
Start: 2023-01-05 | End: 2023-01-05

## 2023-01-05 RX ORDER — SODIUM CHLORIDE, SODIUM LACTATE, POTASSIUM CHLORIDE, CALCIUM CHLORIDE 600; 310; 30; 20 MG/100ML; MG/100ML; MG/100ML; MG/100ML
INJECTION, SOLUTION INTRAVENOUS CONTINUOUS
Status: CANCELLED | OUTPATIENT
Start: 2023-01-05

## 2023-01-05 RX ORDER — BALANCED SALT SOLUTION 6.4; .75; .48; .3; 3.9; 1.7 MG/ML; MG/ML; MG/ML; MG/ML; MG/ML; MG/ML
SOLUTION OPHTHALMIC PRN
Status: DISCONTINUED | OUTPATIENT
Start: 2023-01-05 | End: 2023-01-05 | Stop reason: HOSPADM

## 2023-01-05 RX ORDER — PROPARACAINE HYDROCHLORIDE 5 MG/ML
1 SOLUTION/ DROPS OPHTHALMIC ONCE
Status: COMPLETED | OUTPATIENT
Start: 2023-01-05 | End: 2023-01-05

## 2023-01-05 RX ORDER — PREDNISOLONE ACETATE 10 MG/ML
1-2 SUSPENSION/ DROPS OPHTHALMIC 4 TIMES DAILY
Qty: 10 ML | Refills: 0 | Status: SHIPPED | OUTPATIENT
Start: 2023-01-05 | End: 2023-05-10

## 2023-01-05 RX ORDER — DICLOFENAC SODIUM 1 MG/ML
1 SOLUTION/ DROPS OPHTHALMIC
Status: COMPLETED | OUTPATIENT
Start: 2023-01-05 | End: 2023-01-05

## 2023-01-05 RX ORDER — LIDOCAINE 40 MG/G
CREAM TOPICAL
Status: DISCONTINUED | OUTPATIENT
Start: 2023-01-05 | End: 2023-01-06 | Stop reason: HOSPADM

## 2023-01-05 RX ORDER — DEXAMETHASONE SODIUM PHOSPHATE 4 MG/ML
INJECTION, SOLUTION INTRA-ARTICULAR; INTRALESIONAL; INTRAMUSCULAR; INTRAVENOUS; SOFT TISSUE PRN
Status: DISCONTINUED | OUTPATIENT
Start: 2023-01-05 | End: 2023-01-05 | Stop reason: HOSPADM

## 2023-01-05 RX ADMIN — DICLOFENAC SODIUM 1 DROP: 1 SOLUTION/ DROPS OPHTHALMIC at 10:46

## 2023-01-05 RX ADMIN — SODIUM CHLORIDE, SODIUM LACTATE, POTASSIUM CHLORIDE, CALCIUM CHLORIDE: 600; 310; 30; 20 INJECTION, SOLUTION INTRAVENOUS at 12:00

## 2023-01-05 RX ADMIN — Medication 1 DROP: at 10:46

## 2023-01-05 RX ADMIN — LIDOCAINE HYDROCHLORIDE 60 MG: 20 INJECTION, SOLUTION INFILTRATION; PERINEURAL at 12:02

## 2023-01-05 RX ADMIN — MOXIFLOXACIN 1 DROP: 5 SOLUTION/ DROPS OPHTHALMIC at 10:46

## 2023-01-05 RX ADMIN — Medication 1 DROP: at 10:52

## 2023-01-05 RX ADMIN — PROPOFOL 60 MG: 10 INJECTION, EMULSION INTRAVENOUS at 12:02

## 2023-01-05 RX ADMIN — DICLOFENAC SODIUM 1 DROP: 1 SOLUTION/ DROPS OPHTHALMIC at 10:52

## 2023-01-05 RX ADMIN — MOXIFLOXACIN 1 DROP: 5 SOLUTION/ DROPS OPHTHALMIC at 11:03

## 2023-01-05 RX ADMIN — PROPARACAINE HYDROCHLORIDE 1 DROP: 5 SOLUTION/ DROPS OPHTHALMIC at 10:46

## 2023-01-05 RX ADMIN — DICLOFENAC SODIUM 1 DROP: 1 SOLUTION/ DROPS OPHTHALMIC at 11:03

## 2023-01-05 RX ADMIN — MOXIFLOXACIN 1 DROP: 5 SOLUTION/ DROPS OPHTHALMIC at 10:52

## 2023-01-05 RX ADMIN — Medication 1 DROP: at 11:03

## 2023-01-05 ASSESSMENT — VISUAL ACUITY
METHOD: SNELLEN - LINEAR
OS_SC: 20/100

## 2023-01-05 ASSESSMENT — TONOMETRY
IOP_METHOD: TONOPEN
OS_IOP_MMHG: 12

## 2023-01-05 ASSESSMENT — SLIT LAMP EXAM - LIDS: COMMENTS: NORMAL

## 2023-01-05 NOTE — ANESTHESIA PREPROCEDURE EVALUATION
Anesthesia Pre-Procedure Evaluation    Patient: Buck Delacruz   MRN: [id number] : 1950        Procedure : Procedure(s):  PHACOEMULSIFICATION, CATARACT, WITH INTRAOCULAR LENS IMPLANT          Past Medical History:   Diagnosis Date     Back injury      Bilateral upper abdominal pain 2018     Wrist injury       Past Surgical History:   Procedure Laterality Date     NO HISTORY OF SURGERY        No Known Allergies   Social History     Tobacco Use     Smoking status: Never     Smokeless tobacco: Never   Substance Use Topics     Alcohol use: No      Wt Readings from Last 1 Encounters:   23 63 kg (139 lb)        Anesthesia Evaluation            ROS/MED HX  ENT/Pulmonary:  - neg pulmonary ROS     Neurologic:  - neg neurologic ROS     Cardiovascular:  - neg cardiovascular ROS     METS/Exercise Tolerance:     Hematologic:  - neg hematologic  ROS     Musculoskeletal:  - neg musculoskeletal ROS     GI/Hepatic:  - neg GI/hepatic ROS     Renal/Genitourinary:  - neg Renal ROS     Endo:  - neg endo ROS     Psychiatric/Substance Use:  - neg psychiatric ROS     Infectious Disease:  - neg infectious disease ROS     Malignancy:  - neg malignancy ROS     Other:  - neg other ROS          Physical Exam    Airway  airway exam normal      Mallampati: II   TM distance: > 3 FB   Neck ROM: full   Mouth opening: > 3 cm    Respiratory Devices and Support         Dental  no notable dental history         Cardiovascular          Rhythm and rate: regular and normal     Pulmonary   pulmonary exam normal        breath sounds clear to auscultation           OUTSIDE LABS:  CBC:   Lab Results   Component Value Date    WBC 6.5 2023    WBC 6.1 2018    HGB 13.9 2023    HGB 13.9 2018    HCT 41.1 2023    HCT 41.4 2018     2023     2018     BMP:   Lab Results   Component Value Date     2023     (L) 2022    POTASSIUM 4.0 2023    POTASSIUM 3.7  09/29/2022    CHLORIDE 105 01/02/2023    CHLORIDE 103 09/29/2022    CO2 22 01/02/2023    CO2 22 09/29/2022    BUN 14.2 01/02/2023    BUN 13.8 09/29/2022    CR 0.77 01/02/2023    CR 0.82 09/29/2022     (H) 01/02/2023     (H) 09/29/2022     COAGS: No results found for: PTT, INR, FIBR  POC: No results found for: BGM, HCG, HCGS  HEPATIC:   Lab Results   Component Value Date    ALBUMIN 4.0 09/29/2022    PROTTOTAL 7.2 09/29/2022    ALT 10 09/29/2022    AST 19 09/29/2022    ALKPHOS 66 09/29/2022    BILITOTAL 0.3 09/29/2022     OTHER:   Lab Results   Component Value Date    SURENDRA 9.2 01/02/2023    LIPASE 81 07/20/2018    TSH 2.27 09/29/2022       Anesthesia Plan    ASA Status:  1   NPO Status:  NPO Appropriate    Anesthesia Type: MAC.     - Reason for MAC: immobility needed, straight local not clinically adequate   Induction: Intravenous.           Consents    Anesthesia Plan(s) and associated risks, benefits, and realistic alternatives discussed. Questions answered and patient/representative(s) expressed understanding.    - Discussed:     - Discussed with:  Patient      - Extended Intubation/Ventilatory Support Discussed: No.      - Patient is DNR/DNI Status: No    Use of blood products discussed: No .     Postoperative Care    Pain management: IV analgesics, Oral pain medications, Multi-modal analgesia.   PONV prophylaxis: Ondansetron (or other 5HT-3)     Comments:                Kj Sinclair MD

## 2023-01-05 NOTE — PROGRESS NOTES
Pseudophakia, right eye, day 0  Ayse OD 01/05/23   Doing well  Keep patch in place at night for 7 days  Start post-operative drops and taper according to instructions  Post-operative do's and don'ts reviewed, questions answered    Recheck 1 week    Danie Tavera MD

## 2023-01-05 NOTE — OP NOTE
PREOPERATIVE DIAGNOSIS:   1. Combined form of age-related cataract, right eye    2. Combined forms of age-related cataract of both eyes     Right eye   POSTOPERATIVE DIAGNOSIS: Same   PROCEDURES:   1. Cataract extraction with intraocular lens implant Right eye.  SURGEON: Danie Tavera M.D.  INDICATIONS: The patient Buck Delacruz presented to the eye clinic with decreased vision secondary to cataract in the Right eye. The risks, benefits and alternatives to cataract extraction were discussed. The patient elected to proceed. All questions were answered to the patient's satisfaction.   DESCRIPTION OF PROCEDURE: Prior to the procedure, appropriate cardiac and respiratory monitors were applied to the patient.  The patient was brought to the operating room where a surgical pause was carried out to identify with all members of the surgical team the correct surgical site.  under monitored anesthesia care, a retrobulbar injection of 2% lidocaine with hyaluronidase was given.With adequate anesthesia and akinesia, the Right eye was prepped and draped in the usual sterile fashion. A lid speculum was placed, and the operating microscope was rotated into position. A paracentesis was created.  Through this limbal paracentesis, the anterior chamber was inflated with dispersive viscoelastic. A temporal wound was created at the limbus using a 2.6 mm blade. A capsulorrhexis was initiated using a bent 25-gauge needle and was completed in continuous and circular fashion using the capsulorrhexis forceps. The lens nucleus was hydrodissected using balanced salt solution.  The lens nucleus was rotated and removed using phacoemulsification in a divide and conquer technique.  Residual cortical material was removed using irrigation-aspiration.  The capsular bag was reinflated to its maximal extent with cohesive viscoelastic.  A 21.5 diopter [id number] was inserted into the capsular bag.  The lens power selected was reviewed using the  intraocular lens power measurements that were obtained preoperatively to confirm that the correct lens was selected for the desired post-operative refractive state. The residual viscoelastic was removed in its entirety, the wound were hydrated and found to be self-sealing. A small Descemet's tear was noted at the main wound and filtered air was administered into the anterior chamber. Intracameral moxifloxacin was administered. Tactile pressure was confirmed to be in a normal range. Cefazolin/dexamethasone were injected into the inferior fornix.  The lid speculum was removed, maxitrol ointment and a patch and shield were applied.  The patient tolerated the procedure well, and there were no complications.    PLAN: The patient will be discharged to home and will follow up today  EBL:  None  Complications:  None  Implant Name Type Inv. Item Serial No.  Lot No. LRB No. Used Action   LENS [id number] [employer]IC[employer] IOL - [id number] Lens/Eye Implant LENS [id number].215 [employer]HER[employer] B[employer]EX IOL [id number] [employer]  Right 1 Implanted

## 2023-01-05 NOTE — DISCHARGE INSTRUCTIONS
[facility]  Home Care Following Anesthesia  For 24 hours after surgery:  Get plenty of rest.  A responsible adult must stay with you for at least 24 hours after you leave the surgery center.  Do not drive or use heavy equipment.  If you have weakness or tingling, don't drive or use heavy equipment until this feeling goes away.   Do not drink alcohol.   Avoid strenuous or risky activities.  Ask for help when climbing stairs.  You may feel lightheaded.  IF so, sit for a few minutes before standing.  Have someone help you get up.   If you have nausea (feel sick to your stomach): Drink only clear liquids such as apple juice, ginger ale, broth or 7-Up.  Rest may also help.  Be sure to drink enough fluids.  Move to a regular diet as you feel able.   You may have a slight fever.  Call the doctor if your fever is over 100 F (37.7 C) (taken under the tongue) or lasts longer than 24 hours.  You may have a dry mouth, a sore throat, muscle aches or trouble sleeping. These should go away after 24 hours.  Do not make important or legal decisions.   It is recommended to avoid smoking.               Tips for taking pain medications  To get the best pain relief possible, remember these points:  Take pain medications as directed, before pain becomes severe.  Pain medication can upset your stomach: taking it with food may help.  Constipation is a common side effect of pain medication. Drink plenty of  fluids.  Eat foods high in fiber. Take a stool softener if recommended by your doctor or pharmacist.  Do not drink alcohol, drive or operate machinery while taking pain medications.  Ask about other ways to control pain, such as with heat, ice or relaxation.    Tylenol/Acetaminophen Consumption  To help encourage the safe use of acetaminophen, the makers of TYLENOL  have lowered the maximum daily dose for single-ingredient Extra Strength TYLENOL  (acetaminophen) products sold in the [place] from 8 pills  per day (4,000 mg) to 6 pills per day (3,000 mg). The dosing interval has also changed from 2 pills every 4-6 hours to 2 pills every 6 hours.  If you feel your pain relief is insufficient, you may take Tylenol/Acetaminophen in addition to your narcotic pain medication.   Be careful not to exceed 3,000 mg of Tylenol/Acetaminophen in a 24 hour period from all sources.  If you are taking extra strength Tylenol/acetaminophen (500 mg), the maximum dose is 6 tablets in 24 hours.  If you are taking regular strength acetaminophen (325 mg), the maximum dose is 9 tablets in 24 hours.    Call a doctor for any of the following:  Signs of infection (fever, growing tenderness at the surgery site, a large amount of drainage or bleeding, severe pain, foul-smelling drainage, redness, swelling).  It has been over 8 to 10 hours since surgery and you are still not able to urinate (pass water).  Headache for over 24 hours.  Numbness, tingling or weakness the day after surgery (if you had spinal anesthesia).  Signs of Covid-19 infection (temperature over 100 degrees, shortness of breath, cough, loss of taste/smell, generalized body aches, persistent headache, chills, sore throat, nausea/vomiting/diarrhea)  Your doctor is:  Dr. Danie Tavera, Ophthalmology: [phone]  Or dial [phone] and ask for the resident on call for:  Ophthalmology  For emergency care, call the:  [place] [facility]:  [phone] (TTY for hearing impaired: [phone])

## 2023-01-05 NOTE — ANESTHESIA CARE TRANSFER NOTE
Patient: Buck Delacruz    Procedure: Procedure(s):  PHACOEMULSIFICATION, CATARACT, WITH INTRAOCULAR LENS IMPLANT       Diagnosis: Combined forms of age-related cataract of both eyes [H25.813]  Diagnosis Additional Information: No value filed.    Anesthesia Type:   MAC     Note:    Oropharynx: oropharynx clear of all foreign objects and spontaneously breathing  Level of Consciousness: awake  Oxygen Supplementation: room air    Independent Airway: airway patency satisfactory and stable  Dentition: dentition unchanged  Vital Signs Stable: post-procedure vital signs reviewed and stable  Report to RN Given: handoff report given  Patient transferred to: Phase II    Handoff Report: Identifed the Patient, Identified the Reponsible Provider, Reviewed the pertinent medical history, Discussed the surgical course, Reviewed Intra-OP anesthesia mangement and issues during anesthesia, Set expectations for post-procedure period and Allowed opportunity for questions and acknowledgement of understanding      Vitals:  Vitals Value Taken Time   /70    Temp 98.2    Pulse 56    Resp 20    SpO2 97        Electronically Signed By: CHICO Wagner CRNA  January 5, 2023  12:50 PM

## 2023-01-06 NOTE — ANESTHESIA POSTPROCEDURE EVALUATION
Patient: Buck Delacruz    Procedure: Procedure(s):  PHACOEMULSIFICATION, CATARACT, WITH INTRAOCULAR LENS IMPLANT       Anesthesia Type:  MAC    Note:  Disposition: Outpatient   Postop Pain Control: Uneventful            Sign Out: Well controlled pain   PONV: No   Neuro/Psych: Uneventful            Sign Out: Acceptable/Baseline neuro status   Airway/Respiratory: Uneventful            Sign Out: Acceptable/Baseline resp. status   CV/Hemodynamics: Uneventful            Sign Out: Acceptable CV status   Other NRE: NONE   DID A NON-ROUTINE EVENT OCCUR? No           Last vitals:  Vitals Value Taken Time   /74 01/05/23 1315   Temp 36.2  C (97.2  F) 01/05/23 1315   Pulse 66 01/05/23 1315   Resp 17 01/05/23 1315   SpO2 99 % 01/05/23 1315       Electronically Signed By: Kj Sinclair MD  January 5, 2023  6:00 PM

## 2023-01-11 ENCOUNTER — OFFICE VISIT (OUTPATIENT)
Dept: OPHTHALMOLOGY | Facility: CLINIC | Age: 73
End: 2023-01-11
Attending: OPHTHALMOLOGY
Payer: COMMERCIAL

## 2023-01-11 DIAGNOSIS — Z96.1 PSEUDOPHAKIA, RIGHT EYE: Primary | ICD-10-CM

## 2023-01-11 PROCEDURE — 99024 POSTOP FOLLOW-UP VISIT: CPT | Performed by: OPHTHALMOLOGY

## 2023-01-11 PROCEDURE — G0463 HOSPITAL OUTPT CLINIC VISIT: HCPCS

## 2023-01-11 ASSESSMENT — TONOMETRY
IOP_METHOD: TONOPEN
OD_IOP_MMHG: 15
OS_IOP_MMHG: 13

## 2023-01-11 ASSESSMENT — VISUAL ACUITY
OD_SC: 20/25
OS_SC: 20/200
METHOD: SNELLEN - LINEAR

## 2023-01-11 NOTE — PROGRESS NOTES
HPI     Post Op (Ophthalmology) Right Eye    In right eye.  Onset was gradual.  This started 1 week ago. Additional comments: S/p PHACO IOL  right eye 1/5/22 Dr. Tavera           Comments    We used the help of an  for today's visit.   Pt does not have any concerns currently.  Pt is wondering if his vision is getting better.     Ocular meds:  moxifloxacin (VIGAMOX) 1 drop into the right eye 4 times daily Start 2 days prior to surgery four times a day, after surgery: Four times a day x 1 week ketorolac (ACULAR) 1 drop into the right eye 4 times daily        prednisoLONE acetate (PRED FORTE) 1-2 drop, Right Eye, 4 TIMES DAILY           MARVIN SMITH COA January 11, 2023 10:36 AM             Last edited by MARVIN SMITH on 1/11/2023 10:46 AM.         Review of systems for the eyes was negative other than the pertinent positives/negatives listed in the HPI.      Assessment & Plan    HPI:  Buck Delacruz is a 73 year old male here for POW1 Cataract extraction/IOL right eye 1/5/23  POHx: None  PMHx: Hematuria    Current Medications: ketorolac (ACULAR) 0.5 % ophthalmic solution, Place 1 drop into the right eye 4 times daily Start 2 days prior to surgery four times a day, after surgery: Four times a day x 1 week, three times a day x 1 week, twice a day x 1 week, daily x 1 week then stop  moxifloxacin (VIGAMOX) 0.5 % ophthalmic solution, Place 1 drop into the right eye 4 times daily Start 2 days prior to surgery four times a day, after surgery: Four times a day x 1 week  prednisoLONE acetate (PRED FORTE) 1 % ophthalmic suspension, Place 1-2 drops into the right eye 4 times daily after surgery: Four times a day x 1 week, three times a day x 1 week, twice a day x 1 week, daily x 1 week then stop    No current facility-administered medications on file prior to visit.    PSgyHx: 1/5/23 Cataract extraction/IOL Tavera right eye     Current Eye Medications:   Moxifloxacin/pred forte/ketorolac four times a  day right eye       Assessment & Plan:  Pseudophakia, right eye,  Ayse OD 01/05/23   Doing well  Continue drop taper    (H25.813) Combined forms of age-related cataract of both eyes  Special equipment/needs:  Eye: left  Anesthesia:BLOCK-very poor at following directions  Dilates to: 5  Iris expansion:  No  Pseudoexfoliation: No  Trypan Blue: No  Trauma: No    Able lay to flat: Yes  Blood Thinner: No   Tamsulosin: No  DM: No  Guttae: No    Dominant Eye: right    Plan: [place] right eye, -1.00 left eye     Proceed with CE/IOL left eye     Resident participation discussed  Phone  used    (H04.123) Dry eye syndrome of both eyes   artificial tears four times a day          Return for as scheduled.    Attending Physician Attestation:  Complete documentation of historical and exam elements from today's encounter can be found in the full encounter summary report (not reduplicated in this progress note).  I personally obtained the chief complaint(s) and history of present illness.  I confirmed and edited as necessary the review of systems, past medical/surgical history, family history, social history, and examination findings as documented by others; and I examined the patient myself.  I personally reviewed the relevant tests, images, and reports as documented above.  I formulated and edited as necessary the assessment and plan and discussed the findings and management plan with the patient and family. - Danie Tavera MD

## 2023-01-18 ENCOUNTER — ANESTHESIA EVENT (OUTPATIENT)
Dept: SURGERY | Facility: AMBULATORY SURGERY CENTER | Age: 73
End: 2023-01-18
Payer: COMMERCIAL

## 2023-01-19 ENCOUNTER — ANESTHESIA (OUTPATIENT)
Dept: SURGERY | Facility: AMBULATORY SURGERY CENTER | Age: 73
End: 2023-01-19
Payer: COMMERCIAL

## 2023-01-19 ENCOUNTER — OFFICE VISIT (OUTPATIENT)
Dept: OPHTHALMOLOGY | Facility: CLINIC | Age: 73
End: 2023-01-19

## 2023-01-19 ENCOUNTER — HOSPITAL ENCOUNTER (OUTPATIENT)
Facility: AMBULATORY SURGERY CENTER | Age: 73
Discharge: HOME | End: 2023-01-19
Attending: OPHTHALMOLOGY
Payer: COMMERCIAL

## 2023-01-19 VITALS
RESPIRATION RATE: 16 BRPM | BODY MASS INDEX: 22.13 KG/M2 | SYSTOLIC BLOOD PRESSURE: 147 MMHG | WEIGHT: 141 LBS | HEART RATE: 65 BPM | OXYGEN SATURATION: 100 % | HEIGHT: 67 IN | DIASTOLIC BLOOD PRESSURE: 76 MMHG | TEMPERATURE: 97 F

## 2023-01-19 DIAGNOSIS — H25.812 COMBINED FORM OF AGE-RELATED CATARACT, LEFT EYE: Primary | ICD-10-CM

## 2023-01-19 DIAGNOSIS — H25.811 COMBINED FORM OF AGE-RELATED CATARACT, RIGHT EYE: ICD-10-CM

## 2023-01-19 DIAGNOSIS — Z98.890 POSTOPERATIVE EYE STATE: Primary | ICD-10-CM

## 2023-01-19 PROCEDURE — 99207 PR SERVICE NOT STAFFED W/SUPERV PROV: CPT | Mod: GC | Performed by: OPHTHALMOLOGY

## 2023-01-19 PROCEDURE — 66982 XCAPSL CTRC RMVL CPLX WO ECP: CPT | Mod: LT

## 2023-01-19 PROCEDURE — 66982 XCAPSL CTRC RMVL CPLX WO ECP: CPT | Mod: 79 | Performed by: OPHTHALMOLOGY

## 2023-01-19 DEVICE — LENS CC60WF 22.5 CLAREON UV ASPHERIC BICONVEX IOL: Type: IMPLANTABLE DEVICE | Site: EYE | Status: FUNCTIONAL

## 2023-01-19 RX ORDER — MOXIFLOXACIN 5 MG/ML
1 SOLUTION/ DROPS OPHTHALMIC
Status: COMPLETED | OUTPATIENT
Start: 2023-01-19 | End: 2023-01-19

## 2023-01-19 RX ORDER — SODIUM CHLORIDE, SODIUM LACTATE, POTASSIUM CHLORIDE, CALCIUM CHLORIDE 600; 310; 30; 20 MG/100ML; MG/100ML; MG/100ML; MG/100ML
INJECTION, SOLUTION INTRAVENOUS CONTINUOUS
Status: DISCONTINUED | OUTPATIENT
Start: 2023-01-19 | End: 2023-01-19 | Stop reason: HOSPADM

## 2023-01-19 RX ORDER — LABETALOL HYDROCHLORIDE 5 MG/ML
10 INJECTION, SOLUTION INTRAVENOUS
Status: DISCONTINUED | OUTPATIENT
Start: 2023-01-19 | End: 2023-01-19 | Stop reason: HOSPADM

## 2023-01-19 RX ORDER — CYCLOPENTOLAT/TROPIC/PHENYLEPH 1%-1%-2.5%
1 DROPS (EA) OPHTHALMIC (EYE)
Status: COMPLETED | OUTPATIENT
Start: 2023-01-19 | End: 2023-01-19

## 2023-01-19 RX ORDER — PROPARACAINE HYDROCHLORIDE 5 MG/ML
1 SOLUTION/ DROPS OPHTHALMIC ONCE
Status: DISCONTINUED | OUTPATIENT
Start: 2023-01-19 | End: 2023-01-19 | Stop reason: HOSPADM

## 2023-01-19 RX ORDER — SODIUM CHLORIDE, SODIUM LACTATE, POTASSIUM CHLORIDE, CALCIUM CHLORIDE 600; 310; 30; 20 MG/100ML; MG/100ML; MG/100ML; MG/100ML
INJECTION, SOLUTION INTRAVENOUS CONTINUOUS
Status: CANCELLED | OUTPATIENT
Start: 2023-01-19

## 2023-01-19 RX ORDER — SODIUM CHLORIDE, SODIUM LACTATE, POTASSIUM CHLORIDE, CALCIUM CHLORIDE 600; 310; 30; 20 MG/100ML; MG/100ML; MG/100ML; MG/100ML
INJECTION, SOLUTION INTRAVENOUS CONTINUOUS
Status: DISCONTINUED | OUTPATIENT
Start: 2023-01-19 | End: 2023-01-20 | Stop reason: HOSPADM

## 2023-01-19 RX ORDER — MOXIFLOXACIN IN NACL,ISO-OS/PF 0.3MG/0.3
SYRINGE (ML) INTRAOCULAR PRN
Status: DISCONTINUED | OUTPATIENT
Start: 2023-01-19 | End: 2023-01-19 | Stop reason: HOSPADM

## 2023-01-19 RX ORDER — ONDANSETRON 4 MG/1
4 TABLET, ORALLY DISINTEGRATING ORAL EVERY 30 MIN PRN
Status: DISCONTINUED | OUTPATIENT
Start: 2023-01-19 | End: 2023-01-19 | Stop reason: HOSPADM

## 2023-01-19 RX ORDER — DICLOFENAC SODIUM 1 MG/ML
1 SOLUTION/ DROPS OPHTHALMIC
Status: COMPLETED | OUTPATIENT
Start: 2023-01-19 | End: 2023-01-19

## 2023-01-19 RX ORDER — LIDOCAINE 40 MG/G
CREAM TOPICAL
Status: DISCONTINUED | OUTPATIENT
Start: 2023-01-19 | End: 2023-01-19 | Stop reason: HOSPADM

## 2023-01-19 RX ORDER — BALANCED SALT SOLUTION 6.4; .75; .48; .3; 3.9; 1.7 MG/ML; MG/ML; MG/ML; MG/ML; MG/ML; MG/ML
SOLUTION OPHTHALMIC PRN
Status: DISCONTINUED | OUTPATIENT
Start: 2023-01-19 | End: 2023-01-19 | Stop reason: HOSPADM

## 2023-01-19 RX ORDER — KETOROLAC TROMETHAMINE 5 MG/ML
1 SOLUTION OPHTHALMIC 4 TIMES DAILY
Qty: 10 ML | Refills: 0 | Status: SHIPPED | OUTPATIENT
Start: 2023-01-19 | End: 2023-05-10

## 2023-01-19 RX ORDER — PROPOFOL 10 MG/ML
INJECTION, EMULSION INTRAVENOUS PRN
Status: DISCONTINUED | OUTPATIENT
Start: 2023-01-19 | End: 2023-01-19

## 2023-01-19 RX ORDER — FENTANYL CITRATE 50 UG/ML
25 INJECTION, SOLUTION INTRAMUSCULAR; INTRAVENOUS EVERY 5 MIN PRN
Status: DISCONTINUED | OUTPATIENT
Start: 2023-01-19 | End: 2023-01-19 | Stop reason: HOSPADM

## 2023-01-19 RX ORDER — TETRACAINE HYDROCHLORIDE 5 MG/ML
SOLUTION OPHTHALMIC PRN
Status: DISCONTINUED | OUTPATIENT
Start: 2023-01-19 | End: 2023-01-19 | Stop reason: HOSPADM

## 2023-01-19 RX ORDER — PREDNISOLONE ACETATE 10 MG/ML
1-2 SUSPENSION/ DROPS OPHTHALMIC 4 TIMES DAILY
Qty: 10 ML | Refills: 0 | Status: SHIPPED | OUTPATIENT
Start: 2023-01-19 | End: 2023-05-10

## 2023-01-19 RX ORDER — LIDOCAINE HYDROCHLORIDE 20 MG/ML
INJECTION, SOLUTION INFILTRATION; PERINEURAL PRN
Status: DISCONTINUED | OUTPATIENT
Start: 2023-01-19 | End: 2023-01-19

## 2023-01-19 RX ORDER — SODIUM CHLORIDE, SODIUM LACTATE, POTASSIUM CHLORIDE, CALCIUM CHLORIDE 600; 310; 30; 20 MG/100ML; MG/100ML; MG/100ML; MG/100ML
500 INJECTION, SOLUTION INTRAVENOUS CONTINUOUS
Status: DISCONTINUED | OUTPATIENT
Start: 2023-01-19 | End: 2023-01-19 | Stop reason: HOSPADM

## 2023-01-19 RX ORDER — HYDROMORPHONE HYDROCHLORIDE 1 MG/ML
0.2 INJECTION, SOLUTION INTRAMUSCULAR; INTRAVENOUS; SUBCUTANEOUS EVERY 5 MIN PRN
Status: DISCONTINUED | OUTPATIENT
Start: 2023-01-19 | End: 2023-01-19 | Stop reason: HOSPADM

## 2023-01-19 RX ORDER — FENTANYL CITRATE 50 UG/ML
50 INJECTION, SOLUTION INTRAMUSCULAR; INTRAVENOUS EVERY 5 MIN PRN
Status: DISCONTINUED | OUTPATIENT
Start: 2023-01-19 | End: 2023-01-19 | Stop reason: HOSPADM

## 2023-01-19 RX ORDER — MOXIFLOXACIN 5 MG/ML
1 SOLUTION/ DROPS OPHTHALMIC 4 TIMES DAILY
Qty: 3 ML | Refills: 0 | Status: SHIPPED | OUTPATIENT
Start: 2023-01-19 | End: 2023-05-10

## 2023-01-19 RX ORDER — DEXAMETHASONE SODIUM PHOSPHATE 4 MG/ML
INJECTION, SOLUTION INTRA-ARTICULAR; INTRALESIONAL; INTRAMUSCULAR; INTRAVENOUS; SOFT TISSUE PRN
Status: DISCONTINUED | OUTPATIENT
Start: 2023-01-19 | End: 2023-01-19 | Stop reason: HOSPADM

## 2023-01-19 RX ORDER — ONDANSETRON 2 MG/ML
4 INJECTION INTRAMUSCULAR; INTRAVENOUS EVERY 30 MIN PRN
Status: DISCONTINUED | OUTPATIENT
Start: 2023-01-19 | End: 2023-01-19 | Stop reason: HOSPADM

## 2023-01-19 RX ORDER — HYDROMORPHONE HYDROCHLORIDE 1 MG/ML
0.4 INJECTION, SOLUTION INTRAMUSCULAR; INTRAVENOUS; SUBCUTANEOUS EVERY 5 MIN PRN
Status: DISCONTINUED | OUTPATIENT
Start: 2023-01-19 | End: 2023-01-19 | Stop reason: HOSPADM

## 2023-01-19 RX ORDER — PROPARACAINE HYDROCHLORIDE 5 MG/ML
1 SOLUTION/ DROPS OPHTHALMIC
Status: DISCONTINUED | OUTPATIENT
Start: 2023-01-19 | End: 2023-01-19 | Stop reason: HOSPADM

## 2023-01-19 RX ORDER — ACETAMINOPHEN 325 MG/1
975 TABLET ORAL ONCE
Status: DISCONTINUED | OUTPATIENT
Start: 2023-01-19 | End: 2023-01-19 | Stop reason: HOSPADM

## 2023-01-19 RX ADMIN — DICLOFENAC SODIUM 1 DROP: 1 SOLUTION/ DROPS OPHTHALMIC at 08:14

## 2023-01-19 RX ADMIN — MOXIFLOXACIN 1 DROP: 5 SOLUTION/ DROPS OPHTHALMIC at 08:19

## 2023-01-19 RX ADMIN — LIDOCAINE HYDROCHLORIDE 60 MG: 20 INJECTION, SOLUTION INFILTRATION; PERINEURAL at 08:43

## 2023-01-19 RX ADMIN — MOXIFLOXACIN 1 DROP: 5 SOLUTION/ DROPS OPHTHALMIC at 08:14

## 2023-01-19 RX ADMIN — Medication 1 DROP: at 08:14

## 2023-01-19 RX ADMIN — Medication 1 DROP: at 08:19

## 2023-01-19 RX ADMIN — DICLOFENAC SODIUM 1 DROP: 1 SOLUTION/ DROPS OPHTHALMIC at 08:23

## 2023-01-19 RX ADMIN — DICLOFENAC SODIUM 1 DROP: 1 SOLUTION/ DROPS OPHTHALMIC at 08:19

## 2023-01-19 RX ADMIN — Medication 1 DROP: at 08:23

## 2023-01-19 RX ADMIN — MOXIFLOXACIN 1 DROP: 5 SOLUTION/ DROPS OPHTHALMIC at 08:23

## 2023-01-19 RX ADMIN — PROPOFOL 60 MG: 10 INJECTION, EMULSION INTRAVENOUS at 08:43

## 2023-01-19 RX ADMIN — SODIUM CHLORIDE, SODIUM LACTATE, POTASSIUM CHLORIDE, CALCIUM CHLORIDE 500 ML: 600; 310; 30; 20 INJECTION, SOLUTION INTRAVENOUS at 08:20

## 2023-01-19 RX ADMIN — PROPARACAINE HYDROCHLORIDE 1 DROP: 5 SOLUTION/ DROPS OPHTHALMIC at 08:14

## 2023-01-19 ASSESSMENT — VISUAL ACUITY: OS_SC: CF@6'

## 2023-01-19 ASSESSMENT — TONOMETRY: IOP_METHOD: TONOPEN

## 2023-01-19 NOTE — ANESTHESIA PREPROCEDURE EVALUATION
Anesthesia Pre-Procedure Evaluation    Patient: Buck Delacruz   MRN: [id number] : 1950        Procedure : Procedure(s):  PHACOEMULSIFICATION, CATARACT, WITH INTRAOCULAR LENS IMPLANT          Past Medical History:   Diagnosis Date     Back injury      Bilateral upper abdominal pain 2018     Wrist injury       Past Surgical History:   Procedure Laterality Date     NO HISTORY OF SURGERY       PHACOEMULSIFICATION CLEAR CORNEA WITH STANDARD INTRAOCULAR LENS IMPLANT Right 2023    Procedure: PHACOEMULSIFICATION, CATARACT, WITH INTRAOCULAR LENS IMPLANT;  Surgeon: Danie Tavera MD;  [place] [facility] [place]      No Known Allergies   Social History     Tobacco Use     Smoking status: Never     Smokeless tobacco: Never   Substance Use Topics     Alcohol use: No      Wt Readings from Last 1 Encounters:   23 64 kg (141 lb)        Anesthesia Evaluation            ROS/MED HX  ENT/Pulmonary:  - neg pulmonary ROS     Neurologic:  - neg neurologic ROS     Cardiovascular:  - neg cardiovascular ROS     METS/Exercise Tolerance:     Hematologic:  - neg hematologic  ROS     Musculoskeletal:  - neg musculoskeletal ROS     GI/Hepatic:  - neg GI/hepatic ROS     Renal/Genitourinary:  - neg Renal ROS     Endo:  - neg endo ROS     Psychiatric/Substance Use:  - neg psychiatric ROS     Infectious Disease:  - neg infectious disease ROS     Malignancy:  - neg malignancy ROS     Other:  - neg other ROS          Physical Exam    Airway  airway exam normal      Mallampati: II   TM distance: > 3 FB   Neck ROM: full   Mouth opening: > 3 cm    Respiratory Devices and Support         Dental  no notable dental history         Cardiovascular          Rhythm and rate: regular and normal     Pulmonary   pulmonary exam normal        breath sounds clear to auscultation           OUTSIDE LABS:  CBC:   Lab Results   Component Value Date    WBC 6.5 2023    WBC 6.1 2018    HGB 13.9 2023    HGB 13.9 2018     HCT 41.1 01/02/2023    HCT 41.4 07/20/2018     01/02/2023     07/20/2018     BMP:   Lab Results   Component Value Date     01/02/2023     (L) 09/29/2022    POTASSIUM 4.0 01/02/2023    POTASSIUM 3.7 09/29/2022    CHLORIDE 105 01/02/2023    CHLORIDE 103 09/29/2022    CO2 22 01/02/2023    CO2 22 09/29/2022    BUN 14.2 01/02/2023    BUN 13.8 09/29/2022    CR 0.77 01/02/2023    CR 0.82 09/29/2022     (H) 01/02/2023     (H) 09/29/2022     COAGS: No results found for: PTT, INR, FIBR  POC: No results found for: BGM, HCG, HCGS  HEPATIC:   Lab Results   Component Value Date    ALBUMIN 4.0 09/29/2022    PROTTOTAL 7.2 09/29/2022    ALT 10 09/29/2022    AST 19 09/29/2022    ALKPHOS 66 09/29/2022    BILITOTAL 0.3 09/29/2022     OTHER:   Lab Results   Component Value Date    SURENDRA 9.2 01/02/2023    LIPASE 81 07/20/2018    TSH 2.27 09/29/2022       Anesthesia Plan    ASA Status:  1   NPO Status:  NPO Appropriate    Anesthesia Type: MAC.     - Reason for MAC: immobility needed, straight local not clinically adequate   Induction: Intravenous.           Consents    Anesthesia Plan(s) and associated risks, benefits, and realistic alternatives discussed. Questions answered and patient/representative(s) expressed understanding.    - Discussed:     - Discussed with:  Patient      - Extended Intubation/Ventilatory Support Discussed: No.      - Patient is DNR/DNI Status: No    Use of blood products discussed: No .     Postoperative Care    Pain management: IV analgesics, Oral pain medications, Multi-modal analgesia.   PONV prophylaxis: Ondansetron (or other 5HT-3)     Comments:                    Kj Sinclair MD

## 2023-01-19 NOTE — ANESTHESIA POSTPROCEDURE EVALUATION
Patient: Buck Delacruz    Procedure: Procedure(s):  PHACOEMULSIFICATION, COMPLEX CATARACT, WITH INTRAOCULAR LENS IMPLANT LEFT       Anesthesia Type:  MAC    Note:  Disposition: Outpatient   Postop Pain Control: Uneventful            Sign Out: Well controlled pain   PONV: No   Neuro/Psych: Uneventful            Sign Out: Acceptable/Baseline neuro status   Airway/Respiratory: Uneventful            Sign Out: Acceptable/Baseline resp. status   CV/Hemodynamics: Uneventful            Sign Out: Acceptable CV status   Other NRE: NONE   DID A NON-ROUTINE EVENT OCCUR? No           Last vitals:  Vitals Value Taken Time   /76 01/19/23 1019   Temp 36.1  C (97  F) 01/19/23 1019   Pulse     Resp 16 01/19/23 1019   SpO2 100 % 01/19/23 1019       Electronically Signed By: Kj Sinclair MD  January 19, 2023  1:35 PM

## 2023-01-19 NOTE — PROGRESS NOTES
POD#0, status post cataract surgery, left eye    No complaints.  Denies eye pain. Feels vision is hazy white throughout.    Impression/Plan:  Pseudophakia, OS: POD0, good post-operative appearance. IOP soft but AC formed and no leak from either wound on steve. Received RBB so will keep patched for today and start drops tomorrow QID OS: ketorolac, moxifloxacin, prednisolone.    Eye protection at all times and eye shield at night for 1 week.    Limited activities with no exercise or heavy lifting for 1 week.    Instructed patient to contact us for decreasing vision, eye pain, new floaters or flashes of light or other concerning symptoms.    Written instructions given    Return to clinic POW1 1/25/2023.  Miriam Lord MD  Resident Physician - PGY3  Department of Ophthalmology   [facility]

## 2023-01-19 NOTE — OP NOTE
PREOPERATIVE DIAGNOSIS:   1. Combined form of age-related cataract, left eye    2. Other Age-related cataract H25.89      Left eye   POSTOPERATIVE DIAGNOSIS: Same   PROCEDURES:   1. Cataract extraction with intraocular lens implant Left eye.  SURGEON: Danie Tavera M.D.  ASSISTANT: Casey Lord MD  INDICATIONS: The patient Buck Delacruz presented to the eye clinic with decreased vision secondary to cataract in the Left eye. The risks, benefits and alternatives to cataract extraction were discussed. The patient elected to proceed. All questions were answered to the patient's satisfaction.   DESCRIPTION OF PROCEDURE: Prior to the procedure, appropriate cardiac and respiratory monitors were applied to the patient.  The patient was brought to the operating room where a surgical pause was carried out to identify with all members of the surgical team the correct surgical site.  Under monitored anesthesia care, a retrobulbar injection of 2% lidocaine with hyaluronidase was given.With adequate anesthesia and akinesia, the Left eye was prepped and draped in the usual sterile fashion. A lid speculum was placed, and the operating microscope was rotated into position. A paracentesis was created.  Through this limbal paracentesis, the anterior chamber was inflated with dispersive trypan blue for 30 seconds followed by viscoelastic. A temporal wound was created at the limbus using a 2.6 mm blade. A capsulorrhexis was initiated using a bent 25-gauge needle and was completed in continuous and circular fashion using the capsulorrhexis forceps. The lens nucleus was hydrodissected using balanced salt solution.  The lens nucleus was rotated and removed using phacoemulsification in a divide and conquer technique.  Residual cortical material was removed using irrigation-aspiration.  The capsular bag was reinflated to its maximal extent with cohesive viscoelastic.  A 22.5 diopter [id number] inserted into the capsular bag.  The lens power  selected was reviewed using the intraocular lens power measurements that were obtained preoperatively to confirm that the correct lens was selected for the desired post-operativerefractive state. The residual viscoelastic was removed in its entirety, the wound were hydrated and found to be self-sealing.  Intracameral moxifloxacin was administered. Tactile pressure was confirmed to be in a normal range. Cefazolin/dexamethasone were injected into the inferior fornix.  The lid speculum was removed and a patch and shield were applied.  The patient tolerated the procedure well, and there were no complications.    PLAN: The patient will be discharged to home and will follow up today  EBL:  None  Complications:  None  Implant Name Type Inv. Item Serial No.  Lot No. LRB No. Used Action   LENS [id number] [employer] UV ASPHERIC BICONVEX IOL - [id number] Lens/Eye Implant LENS [id number].225 [employer] UV ASPHERIC BICONVEX IOL [id number] [employer]  Left 1 Implanted   Attending Physician Procedure Attestation: I was present for the entire procedure and was available to assist as needed-Danie Tavera MD

## 2023-01-19 NOTE — ANESTHESIA CARE TRANSFER NOTE
Patient: Buck Delacruz    Procedure: Procedure(s):  PHACOEMULSIFICATION, COMPLEX CATARACT, WITH INTRAOCULAR LENS IMPLANT LEFT       Diagnosis: Combined forms of age-related cataract of both eyes [H25.813]  Diagnosis Additional Information: No value filed.    Anesthesia Type:   MAC     Note:    Oropharynx: oropharynx clear of all foreign objects and spontaneously breathing  Level of Consciousness: awake  Oxygen Supplementation: room air    Independent Airway: airway patency satisfactory and stable  Dentition: dentition unchanged  Vital Signs Stable: post-procedure vital signs reviewed and stable  Report to RN Given: handoff report given  Patient transferred to: Phase II    Handoff Report: Identifed the Patient, Identified the Reponsible Provider, Reviewed the pertinent medical history, Discussed the surgical course, Reviewed Intra-OP anesthesia mangement and issues during anesthesia, Set expectations for post-procedure period and Allowed opportunity for questions and acknowledgement of understanding      Vitals:  Vitals Value Taken Time   /74    Temp 98.2    Pulse 50    Resp 16    SpO2 98        Electronically Signed By: ANA GALLAGHER  January 19, 2023  9:50 AM

## 2023-01-19 NOTE — DISCHARGE INSTRUCTIONS
[facility]  Home Care Following Anesthesia  For 24 hours after surgery:  Get plenty of rest.  A responsible adult must stay with you for at least 24 hours after you leave the surgery center.  Do not drive or use heavy equipment.  If you have weakness or tingling, don't drive or use heavy equipment until this feeling goes away.   Do not drink alcohol.   Avoid strenuous or risky activities.  Ask for help when climbing stairs.  You may feel lightheaded.  IF so, sit for a few minutes before standing.  Have someone help you get up.   If you have nausea (feel sick to your stomach): Drink only clear liquids such as apple juice, ginger ale, broth or 7-Up.  Rest may also help.  Be sure to drink enough fluids.  Move to a regular diet as you feel able.   You may have a slight fever.  Call the doctor if your fever is over 100 F (37.7 C) (taken under the tongue) or lasts longer than 24 hours.  You may have a dry mouth, a sore throat, muscle aches or trouble sleeping. These should go away after 24 hours.  Do not make important or legal decisions.   It is recommended to avoid smoking.               Tips for taking pain medications  To get the best pain relief possible, remember these points:  Take pain medications as directed, before pain becomes severe.  Pain medication can upset your stomach: taking it with food may help.  Constipation is a common side effect of pain medication. Drink plenty of  fluids.  Eat foods high in fiber. Take a stool softener if recommended by your doctor or pharmacist.  Do not drink alcohol, drive or operate machinery while taking pain medications.  Ask about other ways to control pain, such as with heat, ice or relaxation.    Tylenol/Acetaminophen Consumption  To help encourage the safe use of acetaminophen, the makers of TYLENOL  have lowered the maximum daily dose for single-ingredient Extra Strength TYLENOL  (acetaminophen) products sold in the [place] from 8 pills  per day (4,000 mg) to 6 pills per day (3,000 mg). The dosing interval has also changed from 2 pills every 4-6 hours to 2 pills every 6 hours.  If you feel your pain relief is insufficient, you may take Tylenol/Acetaminophen in addition to your narcotic pain medication.   Be careful not to exceed 3,000 mg of Tylenol/Acetaminophen in a 24 hour period from all sources.  If you are taking extra strength Tylenol/acetaminophen (500 mg), the maximum dose is 6 tablets in 24 hours.  If you are taking regular strength acetaminophen (325 mg), the maximum dose is 9 tablets in 24 hours.    Call a doctor for any of the following:  Signs of infection (fever, growing tenderness at the surgery site, a large amount of drainage or bleeding, severe pain, foul-smelling drainage, redness, swelling).  It has been over 8 to 10 hours since surgery and you are still not able to urinate (pass water).  Headache for over 24 hours.  Numbness, tingling or weakness the day after surgery (if you had spinal anesthesia).  Signs of Covid-19 infection (temperature over 100 degrees, shortness of breath, cough, loss of taste/smell, generalized body aches, persistent headache, chills, sore throat, nausea/vomiting/diarrhea)  Your doctor is:  Dr. Danie Tavera, Ophthalmology: [phone]                    Or dial [phone] and ask for the resident on call for:  Ophthalmology  For emergency care, call the:  [facility]:  [phone] (TTY for hearing impaired: [phone])

## 2023-01-25 ENCOUNTER — OFFICE VISIT (OUTPATIENT)
Dept: OPHTHALMOLOGY | Facility: CLINIC | Age: 73
End: 2023-01-25
Attending: OPHTHALMOLOGY
Payer: COMMERCIAL

## 2023-01-25 DIAGNOSIS — Z96.1 PSEUDOPHAKIA OF BOTH EYES: Primary | ICD-10-CM

## 2023-01-25 PROCEDURE — 99024 POSTOP FOLLOW-UP VISIT: CPT | Performed by: OPHTHALMOLOGY

## 2023-01-25 PROCEDURE — G0463 HOSPITAL OUTPT CLINIC VISIT: HCPCS

## 2023-01-25 ASSESSMENT — VISUAL ACUITY
OD_SC: 20/30
METHOD: NUMBERS - SINGLE
OS_PH_SC: 20/30
OS_SC: 20/50

## 2023-01-25 ASSESSMENT — TONOMETRY
OD_IOP_MMHG: 13
OS_IOP_MMHG: 12
IOP_METHOD: TONOPEN

## 2023-01-25 NOTE — PROGRESS NOTES
HPI     Post Op (Ophthalmology) Left Eye    In left eye.  This started days ago.  Quality: States va is ok.  Associated symptoms include eye pain (discomfort) and redness.  Negative for dryness and tearing.  Treatments tried include eye drops.  Pain was noted as 0/10.           Comments    S/p cataract surgery, left eye  ketorolac, moxifloxacin, prednisolone QID left eye   Lizabeth Washburn COT 8:11 AM January 25, 2023     History and physical examination completed with assistance of telephone             Last edited by Danie Tavera MD on 1/25/2023  8:25 AM.         Review of systems for the eyes was negative other than the pertinent positives/negatives listed in the HPI.      Assessment & Plan    HPI:  Buck Delacruz is a 73 year old male here for followup Cataract extraction/IOL both eyes     POHx: None  PMHx: Hematuria    Current Medications: ketorolac (ACULAR) 0.5 % ophthalmic solution, Place 1 drop Into the left eye 4 times daily Start 2 days prior to surgery four times a day, after surgery: Four times a day x 1 week, three times a day x 1 week, twice a day x 1 week, daily x 1 week then stop  ketorolac (ACULAR) 0.5 % ophthalmic solution, Place 1 drop into the right eye 4 times daily Start 2 days prior to surgery four times a day, after surgery: Four times a day x 1 week, three times a day x 1 week, twice a day x 1 week, daily x 1 week then stop  moxifloxacin (VIGAMOX) 0.5 % ophthalmic solution, Place 1 drop Into the left eye 4 times daily Start 2 days prior to surgery four times a day. After Surgery: four times a day  X 1 week  moxifloxacin (VIGAMOX) 0.5 % ophthalmic solution, Place 1 drop into the right eye 4 times daily Start 2 days prior to surgery four times a day, after surgery: Four times a day x 1 week  prednisoLONE acetate (PRED FORTE) 1 % ophthalmic suspension, Place 1-2 drops Into the left eye 4 times daily After surgery: Four times a day x 1 week, three times a day x 1 week,  twice a day x 1 week, daily x 1 week then stop  prednisoLONE acetate (PRED FORTE) 1 % ophthalmic suspension, Place 1-2 drops into the right eye 4 times daily after surgery: Four times a day x 1 week, three times a day x 1 week, twice a day x 1 week, daily x 1 week then stop    No current facility-administered medications on file prior to visit.    PSgyHx: 1/5/23 Cataract extraction/IOL Tavera right eye, 1/19/23 Cataract extraction/IOL Tavera left eye    Current Eye Medications:   Moxifloxacin/pred forte/ketorolac four times a day left eye   pred forte/ketorolac two times a day right eye     Assessment & Plan:  Pseudophakia, both eyes  Tavera right eye 01/05/23   Tavera left eye 1/19/23  Doing well  Continue drop taper both eyes     Phone  used    (H04.123) Dry eye syndrome of both eyes   artificial tears four times a day          Return in about 3 weeks (around 2/15/2023) for v/t/d/mrx FINAL POST OP.    Attending Physician Attestation:  Complete documentation of historical and exam elements from today's encounter can be found in the full encounter summary report (not reduplicated in this progress note).  I personally obtained the chief complaint(s) and history of present illness.  I confirmed and edited as necessary the review of systems, past medical/surgical history, family history, social history, and examination findings as documented by others; and I examined the patient myself.  I personally reviewed the relevant tests, images, and reports as documented above.  I formulated and edited as necessary the assessment and plan and discussed the findings and management plan with the patient and family. - Danie Tavera MD

## 2023-01-25 NOTE — NURSING NOTE
Chief Complaints and History of Present Illnesses   Patient presents with     Post Op (Ophthalmology) Left Eye     Chief Complaint(s) and History of Present Illness(es)     Post Op (Ophthalmology) Left Eye            Laterality: left eye    Onset: days ago    Quality: States va is ok    Associated symptoms: eye pain (discomfort) and redness.  Negative for dryness and tearing    Treatments tried: eye drops    Pain scale: 0/10          Comments    S/p cataract surgery, left eye  ketorolac, moxifloxacin, prednisolone QID left eye   Lizabeth Washburn COT 8:11 AM January 25, 2023

## 2023-02-22 ENCOUNTER — OFFICE VISIT (OUTPATIENT)
Dept: OPHTHALMOLOGY | Facility: CLINIC | Age: 73
End: 2023-02-22
Attending: OPHTHALMOLOGY
Payer: COMMERCIAL

## 2023-02-22 DIAGNOSIS — H52.4 PRESBYOPIA: ICD-10-CM

## 2023-02-22 DIAGNOSIS — H52.201 HYPEROPIA OF RIGHT EYE WITH ASTIGMATISM: Primary | ICD-10-CM

## 2023-02-22 DIAGNOSIS — H52.01 HYPEROPIA OF RIGHT EYE WITH ASTIGMATISM: Primary | ICD-10-CM

## 2023-02-22 DIAGNOSIS — H35.373 EPIRETINAL MEMBRANE (ERM) OF BOTH EYES: ICD-10-CM

## 2023-02-22 PROCEDURE — 92015 DETERMINE REFRACTIVE STATE: CPT

## 2023-02-22 PROCEDURE — G0463 HOSPITAL OUTPT CLINIC VISIT: HCPCS | Mod: 25

## 2023-02-22 PROCEDURE — 99024 POSTOP FOLLOW-UP VISIT: CPT | Performed by: OPHTHALMOLOGY

## 2023-02-22 ASSESSMENT — REFRACTION_MANIFEST
OD_CYLINDER: +0.50
OD_ADD: +2.50
OS_CYLINDER: SPHERE
OS_ADD: +2.50
OD_SPHERE: PLANO
OS_SPHERE: PLANO
OD_AXIS: 035

## 2023-02-22 ASSESSMENT — TONOMETRY
IOP_METHOD: TONOPEN
OS_IOP_MMHG: 14
OD_IOP_MMHG: 13

## 2023-02-22 ASSESSMENT — VISUAL ACUITY
METHOD: NUMBERS - LINEAR
OS_SC: 20/25
OD_SC: 20/25

## 2023-02-22 ASSESSMENT — CUP TO DISC RATIO
OS_RATIO: 0.7
OD_RATIO: 0.7

## 2023-02-22 NOTE — PROGRESS NOTES
HPI     Post Op (Ophthalmology) Both Eyes    In both eyes.  Associated symptoms include eye pain.  Negative for flashes and floaters.  Treatments tried include eye drops.           Comments    Here for post op CE/IOL both eyes. Vision is doing fine. He has finished course of drops. No flashes or floaters. Some eye pain.    Salazar Rangel COT 1:39 PM February 22, 2023     History and physical examination completed with assistance of telephone            Last edited by Salazar Rangel on 2/22/2023  1:39 PM.         Review of systems for the eyes was negative other than the pertinent positives/negatives listed in the HPI.      Assessment & Plan    HPI:  Buck Delacruz is a 73 year old male here for final followup Cataract extraction/IOL both eyes     POHx: None  PMHx: Hematuria    Current Medications: ketorolac (ACULAR) 0.5 % ophthalmic solution, Place 1 drop Into the left eye 4 times daily Start 2 days prior to surgery four times a day, after surgery: Four times a day x 1 week, three times a day x 1 week, twice a day x 1 week, daily x 1 week then stop  ketorolac (ACULAR) 0.5 % ophthalmic solution, Place 1 drop into the right eye 4 times daily Start 2 days prior to surgery four times a day, after surgery: Four times a day x 1 week, three times a day x 1 week, twice a day x 1 week, daily x 1 week then stop  moxifloxacin (VIGAMOX) 0.5 % ophthalmic solution, Place 1 drop Into the left eye 4 times daily Start 2 days prior to surgery four times a day. After Surgery: four times a day  X 1 week  moxifloxacin (VIGAMOX) 0.5 % ophthalmic solution, Place 1 drop into the right eye 4 times daily Start 2 days prior to surgery four times a day, after surgery: Four times a day x 1 week  prednisoLONE acetate (PRED FORTE) 1 % ophthalmic suspension, Place 1-2 drops Into the left eye 4 times daily After surgery: Four times a day x 1 week, three times a day x 1 week, twice a day x 1 week, daily x 1 week then stop  prednisoLONE acetate (PRED  FORTE) 1 % ophthalmic suspension, Place 1-2 drops into the right eye 4 times daily after surgery: Four times a day x 1 week, three times a day x 1 week, twice a day x 1 week, daily x 1 week then stop    No current facility-administered medications on file prior to visit.    PSgyHx: 1/5/23 Cataract extraction/IOL Tavera right eye, 1/19/23 Cataract extraction/IOL Tavera left eye    Current Eye Medications:       Assessment & Plan:  Pseudophakia, both eyes  Tavera right eye 01/05/23   Tavera left eye 1/19/23  Doing well    Phone  used    (H52.01,  H52.201) Hyperopia of right eye with astigmatism  (primary encounter diagnosis)  (H52.4) Presbyopia  Patient has minimal change in hyperopia but a copy of today's glasses prescription was given.  The patient may wish to update the glasses if the lenses are scratched or the frames are too small.  Presbyopia is difficulty seeing up close and is treated with bifocals or over the counter reading glasses    (H35.373) Epiretinal membrane (ERM) of both eyes  MInimal visual significance  OCT Retina next visit      Return in about 1 year (around 2/22/2024) for Annual Visit, OCT Macula.    Attending Physician Attestation:  Complete documentation of historical and exam elements from today's encounter can be found in the full encounter summary report (not reduplicated in this progress note).  I personally obtained the chief complaint(s) and history of present illness.  I confirmed and edited as necessary the review of systems, past medical/surgical history, family history, social history, and examination findings as documented by others; and I examined the patient myself.  I personally reviewed the relevant tests, images, and reports as documented above.  I formulated and edited as necessary the assessment and plan and discussed the findings and management plan with the patient and family. - Danie Tavera MD

## 2023-02-22 NOTE — NURSING NOTE
Chief Complaints and History of Present Illnesses   Patient presents with     Post Op (Ophthalmology) Both Eyes     Chief Complaint(s) and History of Present Illness(es)     Post Op (Ophthalmology) Both Eyes            Laterality: both eyes    Associated symptoms: eye pain.  Negative for flashes and floaters    Treatments tried: eye drops          Comments    Here for post op CE/IOL both eyes. Vision is doing fine. He has finished course of drops. No flashes or floaters. Some eye pain.    Salazar Rangel COT 1:39 PM February 22, 2023

## 2023-03-03 ENCOUNTER — APPOINTMENT (OUTPATIENT)
Dept: INTERPRETER SERVICES | Facility: CLINIC | Age: 73
End: 2023-03-03
Payer: COMMERCIAL

## 2023-03-03 ENCOUNTER — NURSE TRIAGE (OUTPATIENT)
Dept: NURSING | Facility: CLINIC | Age: 73
End: 2023-03-03

## 2023-03-03 DIAGNOSIS — Z98.890 POSTSURGICAL STATE, EYE: Primary | ICD-10-CM

## 2023-03-03 RX ORDER — PREDNISOLONE ACETATE 10 MG/ML
SUSPENSION/ DROPS OPHTHALMIC
Qty: 5 ML | Refills: 0 | Status: SHIPPED | OUTPATIENT
Start: 2023-03-03 | End: 2023-05-10

## 2023-03-03 RX ORDER — CARBOXYMETHYLCELLULOSE SODIUM 5 MG/ML
1 SOLUTION/ DROPS OPHTHALMIC 3 TIMES DAILY PRN
Qty: 15 ML | Refills: 5 | Status: SHIPPED | OUTPATIENT
Start: 2023-03-03

## 2023-03-03 NOTE — TELEPHONE ENCOUNTER
73 year old male s/p Cataract extraction with intraocular lens implant Left eye on 1/19/23 and Cataract extraction with intraocular lens implant Right eye on 1/5/23  He showed up at the [facility] clinic today requesting eye gtts  He told them his eyes were throbbing  L>R     They would like Jane to be evaluated       Will forward to eye clinic to follow-up with pt

## 2023-03-03 NOTE — TELEPHONE ENCOUNTER
Called pt with  at [phone]    Left eye with new pain/redness/light sensitivities times 2 day after finishing with post-op eye drops 2 days ago.    Will review plan with Dr. Tavera this Friday afternoon    --  Reviewed with Dr. Tavera and Rx for prednisolone eye drop sent with quick taper and recommendation for artificial tears    Pt has [phone] option 4 for information for any worsening symptoms/vision changes    Pt to f/u next week if still having any symptoms    Pt/son verbally demonstrated understanding    Santos Zaragoza RN 4:32 PM 03/03/23                      romelia [phone]     Called number and pressed for nurse triage.    After longer hold used option to leave message for callback and provided direct triage number for call back    Santos Zaragoza RN 3:15 PM 03/03/23

## 2023-04-07 ENCOUNTER — MEDICAL CORRESPONDENCE (OUTPATIENT)
Dept: HEALTH INFORMATION MANAGEMENT | Facility: CLINIC | Age: 73
End: 2023-04-07
Payer: COMMERCIAL

## 2023-04-12 ENCOUNTER — TRANSCRIBE ORDERS (OUTPATIENT)
Dept: OTHER | Age: 73
End: 2023-04-12

## 2023-04-12 ENCOUNTER — OFFICE VISIT (OUTPATIENT)
Dept: OPHTHALMOLOGY | Facility: CLINIC | Age: 73
End: 2023-04-12
Attending: OPHTHALMOLOGY
Payer: COMMERCIAL

## 2023-04-12 DIAGNOSIS — H57.13 PAIN OF BOTH EYES: Primary | ICD-10-CM

## 2023-04-12 DIAGNOSIS — H57.13 PAIN OF BOTH EYES: ICD-10-CM

## 2023-04-12 DIAGNOSIS — T15.12XA FOREIGN BODY OF CONJUNCTIVAL SAC OF LEFT EYE, INITIAL ENCOUNTER: Primary | ICD-10-CM

## 2023-04-12 DIAGNOSIS — H11.153 PINGUECULA OF BOTH EYES: ICD-10-CM

## 2023-04-12 PROCEDURE — 65205 REMOVE FOREIGN BODY FROM EYE: CPT | Performed by: OPHTHALMOLOGY

## 2023-04-12 PROCEDURE — G0463 HOSPITAL OUTPT CLINIC VISIT: HCPCS | Performed by: OPHTHALMOLOGY

## 2023-04-12 RX ORDER — OLOPATADINE HYDROCHLORIDE 2 MG/ML
1 SOLUTION/ DROPS OPHTHALMIC DAILY
Qty: 2.5 ML | Refills: 4 | Status: SHIPPED | OUTPATIENT
Start: 2023-04-12

## 2023-04-12 ASSESSMENT — TONOMETRY
IOP_METHOD: ICARE
OD_IOP_MMHG: 9
OS_IOP_MMHG: 8

## 2023-04-12 ASSESSMENT — VISUAL ACUITY
OS_SC: 20/40
METHOD: NUMBERS - LINEAR
OD_SC+: -2
OD_SC: 20/25

## 2023-04-12 NOTE — NURSING NOTE
Chief Complaints and History of Present Illnesses   Patient presents with     Eye Pain Both Eyes     Chief Complaint(s) and History of Present Illness(es)     Eye Pain Both Eyes            Laterality: In both eyes    Quality: sharp pain    Associated symptoms: itching.  Negative for blurred vision    Treatments tried: no treatments          Comments    Here for eye pain both eyes. Describes as sharp and constant. There is also intermittent itching. Vision is doing well. Denies using lubricating drops. Intermittent flashes without floaters.    Salazar MUHAMMAD 11:00 AM April 12, 2023     History and physical examination completed with assistance of telephone

## 2023-04-12 NOTE — PROGRESS NOTES
HPI     Eye Pain Both Eyes      In both eyes.  Characterized as sharp pain.  Associated symptoms include itching.  Negative for blurred vision.  Treatments tried include no treatments.           Comments    Here for eye pain both eyes. Describes as sharp and constant. There is also intermittent itching. Vision is doing well. Denies using lubricating drops. Intermittent flashes without floaters.    Salazar Rangel JB 11:00 AM April 12, 2023     History and physical examination completed with assistance of telephone    History and physical examination completed with assistance of telephone              Last edited by Danie Tavera MD on 4/12/2023 11:35 AM.         Review of systems for the eyes was negative other than the pertinent positives/negatives listed in the HPI.      Assessment & Plan    HPI:  Buck Delacruz is a 73 year old male here for intermittent bilateral Foreign body sensation and itching.     POHx: None  PMHx: Hematuria    Current Medications: carboxymethylcellulose (CARBOXYMETHYLCELLULOSE SODIUM) 0.5 % SOLN ophthalmic solution, Place 1 drop into both eyes 3 times daily as needed for dry eyes  ketorolac (ACULAR) 0.5 % ophthalmic solution, Place 1 drop Into the left eye 4 times daily Start 2 days prior to surgery four times a day, after surgery: Four times a day x 1 week, three times a day x 1 week, twice a day x 1 week, daily x 1 week then stop  ketorolac (ACULAR) 0.5 % ophthalmic solution, Place 1 drop into the right eye 4 times daily Start 2 days prior to surgery four times a day, after surgery: Four times a day x 1 week, three times a day x 1 week, twice a day x 1 week, daily x 1 week then stop  moxifloxacin (VIGAMOX) 0.5 % ophthalmic solution, Place 1 drop Into the left eye 4 times daily Start 2 days prior to surgery four times a day. After Surgery: four times a day  X 1 week  moxifloxacin (VIGAMOX) 0.5 % ophthalmic solution, Place 1 drop into the right eye 4 times  daily Start 2 days prior to surgery four times a day, after surgery: Four times a day x 1 week  prednisoLONE acetate (PRED FORTE) 1 % ophthalmic suspension, One drop in left eye 3 times a day for 3 days, then 2/day for 2 days, then once a day for one day, then stop  prednisoLONE acetate (PRED FORTE) 1 % ophthalmic suspension, Place 1-2 drops Into the left eye 4 times daily After surgery: Four times a day x 1 week, three times a day x 1 week, twice a day x 1 week, daily x 1 week then stop  prednisoLONE acetate (PRED FORTE) 1 % ophthalmic suspension, Place 1-2 drops into the right eye 4 times daily after surgery: Four times a day x 1 week, three times a day x 1 week, twice a day x 1 week, daily x 1 week then stop    No current facility-administered medications on file prior to visit.    PSgyHx: 1/5/23 Cataract extraction/IOL Tavera right eye, 1/19/23 Cataract extraction/IOL Tavera left eye    Current Eye Medications:       Assessment & Plan:  (T15.12XA) Foreign body of conjunctival sac of left eye, initial encounter  (primary encounter diagnosis)  (H57.13) Pain of both eyes  (H11.153) Pinguecula of both eyes  Eyelash removed from fornix left eye   Use AT four times a day    Start pataday qday for itching    May benefit from short course of mild topical steroids if no improvement from topical lubrication alone      Pseudophakia, both eyes  Tavera right eye 01/05/23   Tavera left eye 1/19/23  Doing well    (H52.01,  H52.201) Hyperopia of right eye with astigmatism  (primary encounter diagnosis)  (H52.4) Presbyopia  Patient has minimal change in hyperopia but a copy of today's glasses prescription was given.  The patient may wish to update the glasses if the lenses are scratched or the frames are too small.  Presbyopia is difficulty seeing up close and is treated with bifocals or over the counter reading glasses    (H35.373) Epiretinal membrane (ERM) of both eyes  MInimal visual significance  OCT Retina next  visit      Return in about 4 weeks (around 5/10/2023) for Follow Up, v/t.    Attending Physician Attestation:  Complete documentation of historical and exam elements from today's encounter can be found in the full encounter summary report (not reduplicated in this progress note).  I personally obtained the chief complaint(s) and history of present illness.  I confirmed and edited as necessary the review of systems, past medical/surgical history, family history, social history, and examination findings as documented by others; and I examined the patient myself.  I personally reviewed the relevant tests, images, and reports as documented above.  I formulated and edited as necessary the assessment and plan and discussed the findings and management plan with the patient and family. - Danie Tavera MD

## 2023-04-12 NOTE — PATIENT INSTRUCTIONS
"Start pataday once daily for itching    Use one drop of artificial tears both eyes 3-4 x daily.  Continue to use the drops regardless if your eyes are comfortable.  Artificial tears work best as a preventative and not as well after your eyes are starting to bother you.  Preservative-free drops are best if you will be using them more than 6x daily. Some brands include: Celluvisc, Refresh, Systane, Blink, Optive, iVizia. Avoid using any drops that state \"get the red out\". Also, use lubricating artificial tear ointment at bedtime in both eyes every night.  Genteal and Refresh PM are preservative-free; generic brands and Lacrilube are not.    It may take 4-6 weeks of using the drops before you notice improvement.  If after that time you are still having problems schedule an appointment for an evaluation and discussion of different treatments which may include medicated eye drops, punctal plugs to keep the tears that are made and supplemented on the surface of the eye longer, or other treatments. Dry eyes are a chronic condition and you may have more symptoms at certain times of the year.    "

## 2023-05-10 ENCOUNTER — OFFICE VISIT (OUTPATIENT)
Dept: OPHTHALMOLOGY | Facility: CLINIC | Age: 73
End: 2023-05-10
Attending: OPHTHALMOLOGY
Payer: COMMERCIAL

## 2023-05-10 DIAGNOSIS — H35.373 EPIRETINAL MEMBRANE (ERM) OF BOTH EYES: Primary | ICD-10-CM

## 2023-05-10 DIAGNOSIS — H43.823 VITREOMACULAR ADHESION OF BOTH EYES: ICD-10-CM

## 2023-05-10 DIAGNOSIS — H11.153 PINGUECULA OF BOTH EYES: ICD-10-CM

## 2023-05-10 PROCEDURE — G0463 HOSPITAL OUTPT CLINIC VISIT: HCPCS | Performed by: OPHTHALMOLOGY

## 2023-05-10 PROCEDURE — 92014 COMPRE OPH EXAM EST PT 1/>: CPT | Performed by: OPHTHALMOLOGY

## 2023-05-10 PROCEDURE — 92134 CPTRZ OPH DX IMG PST SGM RTA: CPT | Performed by: OPHTHALMOLOGY

## 2023-05-10 RX ORDER — FLUOROMETHOLONE 0.1 %
1 SUSPENSION, DROPS(FINAL DOSAGE FORM)(ML) OPHTHALMIC (EYE) 4 TIMES DAILY
Qty: 10 ML | Refills: 1 | Status: SHIPPED | OUTPATIENT
Start: 2023-05-10 | End: 2023-06-07

## 2023-05-10 ASSESSMENT — CONF VISUAL FIELD
OS_INFERIOR_TEMPORAL_RESTRICTION: 0
OD_INFERIOR_TEMPORAL_RESTRICTION: 0
OD_SUPERIOR_NASAL_RESTRICTION: 0
OD_INFERIOR_NASAL_RESTRICTION: 0
OS_INFERIOR_NASAL_RESTRICTION: 0
OS_SUPERIOR_TEMPORAL_RESTRICTION: 0
OD_SUPERIOR_TEMPORAL_RESTRICTION: 0
OS_NORMAL: 1
OD_NORMAL: 1
OS_SUPERIOR_NASAL_RESTRICTION: 0

## 2023-05-10 ASSESSMENT — TONOMETRY
IOP_METHOD: TONOPEN
OS_IOP_MMHG: 12
OD_IOP_MMHG: 12

## 2023-05-10 ASSESSMENT — VISUAL ACUITY
OD_SC: 20/25
OS_SC: 20/50
METHOD: SNELLEN - LINEAR

## 2023-05-10 NOTE — PROGRESS NOTES
HPI     Follow Up    In both eyes.  Associated symptoms include eye pain, pain with eye movement and itching.  Pain was noted as 8/10. Additional comments: 4 week follow up BE  Sharp pain feels like something biting   Citlaly Hebertearl COA 2:06 PM May 10, 2023                Comments    History and physical examination completed with assistance of telephone             Last edited by Danie Tavera MD on 5/10/2023  2:13 PM.         Review of systems for the eyes was negative other than the pertinent positives/negatives listed in the HPI.      Assessment & Plan    HPI:  Buck Delacruz is a 73 year old male here for followup intermittent bilateral Foreign body sensation and itching. He is using a drop three times a day, which has been helping with the itching. He notes left eye worse than right eye     POHx: None  PMHx: Hematuria    Current Medications: carboxymethylcellulose (CARBOXYMETHYLCELLULOSE SODIUM) 0.5 % SOLN ophthalmic solution, Place 1 drop into both eyes 3 times daily as needed for dry eyes  hydroxypropyl methylcellulose (GENTEAL) 0.2 % SOLN ophthalmic solution, Place 1 drop into both eyes 4 times daily  olopatadine (PATADAY) 0.2 % ophthalmic solution, Place 0.05 mLs (1 drop) into both eyes daily    No current facility-administered medications on file prior to visit.    PSgyHx: 1/5/23 Cataract extraction/IOL Tavera right eye, 1/19/23 Cataract extraction/IOL Tavera left eye    Current Eye Medications:   Pataday three times a day?     Assessment & Plan:  (T15.12XA) Foreign body of conjunctival sac of left eye, initial encounter  (primary encounter diagnosis)  (H57.13) Pain of both eyes  (H11.153) Pinguecula of both eyes  Use AT four times a day    Pataday qday for itching  May benefit from short course of mild topical steroids if no improvement from topical lubrication alone  Given minimal improvement, will start patient on FML four times a day  Until seen  Continue artificial  tears    Pseudophakia, both eyes  Tavera right eye 01/05/23   Tavera left eye 1/19/23  Doing well  No explanation for decreased visual acuity left eye today    (H52.01,  H52.201) Hyperopia of right eye with astigmatism  (primary encounter diagnosis)  (H52.4) Presbyopia  MRx dispensed previously      (H35.373) Epiretinal membrane (ERM) of both eyes  (primary encounter diagnosis)  (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    Attending Physician Attestation:  Complete documentation of historical and exam elements from today's encounter can be found in the full encounter summary report (not reduplicated in this progress note).  I personally obtained the chief complaint(s) and history of present illness.  I confirmed and edited as necessary the review of systems, past medical/surgical history, family history, social history, and examination findings as documented by others; and I examined the patient myself.  I personally reviewed the relevant tests, images, and reports as documented above.  I formulated and edited as necessary the assessment and plan and discussed the findings and management plan with the patient and family. - Danie Tavera MD

## 2023-05-10 NOTE — NURSING NOTE
Chief Complaints and History of Present Illnesses   Patient presents with     Follow Up     4 week follow up BE  Sharp pain feels like something biting   Citlaly Gallego VAISHNAVI 2:06 PM May 10, 2023          Chief Complaint(s) and History of Present Illness(es)     Follow Up            Laterality: both eyes    Associated symptoms: eye pain, pain with eye movement and itching    Pain scale: 8/10    Comments: 4 week follow up BE  Sharp pain feels like something biting   Citlaly Gallego VAISHNAVI 2:06 PM May 10, 2023

## 2023-06-07 ENCOUNTER — OFFICE VISIT (OUTPATIENT)
Dept: OPHTHALMOLOGY | Facility: CLINIC | Age: 73
End: 2023-06-07
Attending: OPHTHALMOLOGY
Payer: COMMERCIAL

## 2023-06-07 DIAGNOSIS — H57.13 PAIN OF BOTH EYES: ICD-10-CM

## 2023-06-07 DIAGNOSIS — H04.123 DRY EYE SYNDROME OF BOTH EYES: ICD-10-CM

## 2023-06-07 DIAGNOSIS — H11.153 PINGUECULA OF BOTH EYES: Primary | ICD-10-CM

## 2023-06-07 PROCEDURE — 99212 OFFICE O/P EST SF 10 MIN: CPT | Performed by: OPHTHALMOLOGY

## 2023-06-07 PROCEDURE — G0463 HOSPITAL OUTPT CLINIC VISIT: HCPCS | Performed by: OPHTHALMOLOGY

## 2023-06-07 ASSESSMENT — CONF VISUAL FIELD
OS_INFERIOR_TEMPORAL_RESTRICTION: 0
OD_NORMAL: 1
OD_SUPERIOR_NASAL_RESTRICTION: 0
OD_SUPERIOR_TEMPORAL_RESTRICTION: 0
OS_SUPERIOR_TEMPORAL_RESTRICTION: 0
OD_INFERIOR_TEMPORAL_RESTRICTION: 0
OS_NORMAL: 1
OS_INFERIOR_NASAL_RESTRICTION: 0
OD_INFERIOR_NASAL_RESTRICTION: 0
OS_SUPERIOR_NASAL_RESTRICTION: 0

## 2023-06-07 ASSESSMENT — VISUAL ACUITY
OS_SC: 20/40
METHOD: SNELLEN - LINEAR
OD_SC+: -1
OD_SC: 20/25

## 2023-06-07 ASSESSMENT — TONOMETRY
IOP_METHOD: TONOPEN
OS_IOP_MMHG: 09
OD_IOP_MMHG: 13

## 2023-06-07 NOTE — PROGRESS NOTES
HPI     Follow Up    In both eyes.  Associated symptoms include burning.  Negative for eye pain.  Treatments tried include no treatments.  Response to treatment was significant improvement.  Pain was noted as 0/10. Additional comments: 4 week follow up   Slight burning, much improved since last visit  No drops, ran out  Citlaly Gallego COA 2:09 PM June 7, 2023               Last edited by Citlaly Gallego on 6/7/2023  2:10 PM.         Review of systems for the eyes was negative other than the pertinent positives/negatives listed in the HPI.      Assessment & Plan    HPI:  Buck Delacruz is a 73 year old male here for followup intermittent bilateral Foreign body sensation and itching. He was using FML four times a day with resolution of symptoms    POHx: None  PMHx: Hematuria    Current Medications: carboxymethylcellulose (CARBOXYMETHYLCELLULOSE SODIUM) 0.5 % SOLN ophthalmic solution, Place 1 drop into both eyes 3 times daily as needed for dry eyes  hydroxypropyl methylcellulose (GENTEAL) 0.2 % SOLN ophthalmic solution, Place 1 drop into both eyes 4 times daily  olopatadine (PATADAY) 0.2 % ophthalmic solution, Place 0.05 mLs (1 drop) into both eyes daily    No current facility-administered medications on file prior to visit.    PSgyHx: 1/5/23 Cataract extraction/IOL Tavera right eye, 1/19/23 Cataract extraction/IOL Tavera left eye    Current Eye Medications:   None    Assessment & Plan:  (H57.13) Pain of both eyes  (H11.153) Pinguecula of both eyes  (H04.123) Dry eye syndrome of both eyes  Benefited significantly from FML course  Again recommend Use AT four times a day   Again Recommend OTC anti-histamine drop (Pataday, Lastacaft, or Zaditor) as needed for itching    Pseudophakia, both eyes  Tavera right eye 01/05/23   Tavera left eye 1/19/23  Doing well    (H52.01,  H52.201) Hyperopia of right eye with astigmatism   (H52.4) Presbyopia  MRx dispensed previously    (H35.373) Epiretinal membrane (ERM) of both  eyes  (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    Attending Physician Attestation:  Complete documentation of historical and exam elements from today's encounter can be found in the full encounter summary report (not reduplicated in this progress note).  I personally obtained the chief complaint(s) and history of present illness.  I confirmed and edited as necessary the review of systems, past medical/surgical history, family history, social history, and examination findings as documented by others; and I examined the patient myself.  I personally reviewed the relevant tests, images, and reports as documented above.  I formulated and edited as necessary the assessment and plan and discussed the findings and management plan with the patient and family. - Danie Tavera MD

## 2023-06-07 NOTE — NURSING NOTE
Chief Complaints and History of Present Illnesses   Patient presents with     Follow Up     4 week follow up   Slight burning, much improved since last visit  No drops, ran out  Citlaly RIGGINS 2:09 PM June 7, 2023          Chief Complaint(s) and History of Present Illness(es)     Follow Up            Laterality: both eyes    Associated symptoms: burning.  Negative for eye pain    Treatments tried: no treatments    Response to treatment: significant improvement    Pain scale: 0/10    Comments: 4 week follow up   Slight burning, much improved since last visit  No drops, ran out  Citlaly RIGGINS 2:09 PM June 7, 2023

## 2023-06-07 NOTE — PATIENT INSTRUCTIONS
"Use one drop of artificial tears both eyes 3-4 x daily.  Continue to use the drops regardless if your eyes are comfortable.  Artificial tears work best as a preventative and not as well after your eyes are starting to bother you.  Preservative-free drops are best if you will be using them more than 6x daily. Some brands include: Celluvisc, Refresh, Systane, Blink, Optive, iVizia. Avoid using any drops that state \"get the red out\".     It may take 4-6 weeks of using the drops before you notice improvement.  If after that time you are still having problems schedule an appointment for an evaluation and discussion of different treatments which may include medicated eye drops, punctal plugs to keep the tears that are made and supplemented on the surface of the eye longer, or other treatments. Dry eyes are a chronic condition and you may have more symptoms at certain times of the year.    Recommend OTC anti-histamine drop (Pataday, Lastacaft, or Zaditor) as needed for itching      "

## 2023-12-21 ENCOUNTER — LAB REQUISITION (OUTPATIENT)
Dept: LAB | Facility: CLINIC | Age: 73
End: 2023-12-21
Payer: COMMERCIAL

## 2023-12-21 DIAGNOSIS — Z00.00 ENCOUNTER FOR GENERAL ADULT MEDICAL EXAMINATION WITHOUT ABNORMAL FINDINGS: ICD-10-CM

## 2023-12-21 PROCEDURE — 80061 LIPID PANEL: CPT | Mod: ORL | Performed by: SURGERY

## 2023-12-21 PROCEDURE — 80061 LIPID PANEL: CPT | Mod: ORL

## 2023-12-22 LAB
CHOLEST SERPL-MCNC: 200 MG/DL
FASTING STATUS PATIENT QL REPORTED: NO
HDLC SERPL-MCNC: 38 MG/DL
LDLC SERPL CALC-MCNC: 123 MG/DL
NONHDLC SERPL-MCNC: 162 MG/DL
TRIGL SERPL-MCNC: 194 MG/DL

## 2024-07-10 ENCOUNTER — TRANSCRIBE ORDERS (OUTPATIENT)
Dept: OTHER | Age: 74
End: 2024-07-10

## 2024-07-10 DIAGNOSIS — G89.29 CHRONIC RIGHT-SIDED THORACIC BACK PAIN: Primary | ICD-10-CM

## 2024-07-10 DIAGNOSIS — M54.6 CHRONIC RIGHT-SIDED THORACIC BACK PAIN: Primary | ICD-10-CM

## (undated) DEVICE — LINEN TOWEL PACK X5 5464

## (undated) DEVICE — EYE CANN IRR 25GA CYSTOTOME 581610

## (undated) DEVICE — EYE TIP IRRIGATION & ASPIRATION POLYMER 35D BENT 8065751511

## (undated) DEVICE — GLOVE PROTEXIS MICRO 7.5  2D73PM75

## (undated) DEVICE — EYE SHIELD PLASTIC

## (undated) DEVICE — PACK CATARACT CUSTOM ASC SEY15CPUMC

## (undated) DEVICE — EYE KNIFE STILETTO VISITEC 1.1MM ANG 45DEG SIDEPORT 376620

## (undated) DEVICE — EYE KNIFE SLIT XSTAR VISITEC 2.6MM 45DEG 373726

## (undated) DEVICE — SOL WATER IRRIG 500ML BOTTLE 2F7113

## (undated) DEVICE — EYE PACK CUSTOM ANTERIOR 30DEG TIP CENTURION PPK6682-04

## (undated) DEVICE — EYE CANN IRR 27GA ANTERIOR CHAMBER 581280

## (undated) RX ORDER — ACETAMINOPHEN 325 MG/1
TABLET ORAL
Status: DISPENSED
Start: 2023-01-05

## (undated) RX ORDER — PROPOFOL 10 MG/ML
INJECTION, EMULSION INTRAVENOUS
Status: DISPENSED
Start: 2023-01-19